# Patient Record
Sex: FEMALE | Race: BLACK OR AFRICAN AMERICAN | Employment: FULL TIME | URBAN - METROPOLITAN AREA
[De-identification: names, ages, dates, MRNs, and addresses within clinical notes are randomized per-mention and may not be internally consistent; named-entity substitution may affect disease eponyms.]

---

## 2022-07-01 ENCOUNTER — OFFICE VISIT (OUTPATIENT)
Dept: FAMILY MEDICINE CLINIC | Age: 47
End: 2022-07-01
Payer: COMMERCIAL

## 2022-07-01 VITALS
RESPIRATION RATE: 16 BRPM | HEIGHT: 65 IN | OXYGEN SATURATION: 100 % | DIASTOLIC BLOOD PRESSURE: 72 MMHG | SYSTOLIC BLOOD PRESSURE: 118 MMHG | WEIGHT: 212 LBS | BODY MASS INDEX: 35.32 KG/M2 | HEART RATE: 81 BPM

## 2022-07-01 DIAGNOSIS — Z12.11 COLON CANCER SCREENING: ICD-10-CM

## 2022-07-01 DIAGNOSIS — Z76.89 ENCOUNTER TO ESTABLISH CARE: Primary | ICD-10-CM

## 2022-07-01 DIAGNOSIS — K21.9 GASTROESOPHAGEAL REFLUX DISEASE WITHOUT ESOPHAGITIS: ICD-10-CM

## 2022-07-01 DIAGNOSIS — Z01.89 ENCOUNTER FOR BLOOD TEST: ICD-10-CM

## 2022-07-01 PROCEDURE — 99203 OFFICE O/P NEW LOW 30 MIN: CPT | Performed by: STUDENT IN AN ORGANIZED HEALTH CARE EDUCATION/TRAINING PROGRAM

## 2022-07-01 NOTE — PROGRESS NOTES
Gilda Ross is an 49 y.o. female who presents to [facility] care   Patient was previously receiving care at: PCP: Dr. Ezekiel Johnston. Last time she was seen ~ 2 years ago. Medical history significant for: GERD. GERD: Omeprazole 40 mg PRN. Currently not having any complaints. Gyn Care: Does not have OB/GYN MD. PAP Smear 2 year ago. [id number]. Regular menses. LMP: 6/26. Duration: 5 days, moderate bleeding. Have cramps and pain. Social: . Currently living with  and one child. The other 3 children are in [place]. Alcohol: occasionally: Never smoke. No illicit drugs. Exercise: Not at all   Diet: Mostly homemade food. Occupation: CNA. Covid vaccine x3 1/4/21--1/25/21. Booster 10/21  Reports getting Tdap in 2020. Review of Systems   General/Constitutional:   No headache, fever, fatigue, weight loss or weight gain       Eyes:   No redness, pruritis, pain, visual changes, swelling, or discharge      Ears:    No pain, loss or changes in hearing     Neck:   No swelling, masses, stiffness, pain, or limited movement     Cardiac:    No chest pain      Respiratory:   No cough or shortness of breath     GI:   No nausea/vomiting, diarrhea, abdominal pain, bloody or dark stools       :   No dysuria or  hematuria    Neurological:   No loss of consciousness, dizziness, seizures, dysarthria, cognitive changes, memory changes,  problems with balance, or unilateral weakness     Skin: No rash     Current Medications  Current medications include:   No current outpatient medications on file. No current facility-administered medications for this visit. Allergies  Allergies   Allergen Reactions    Aspirin Other (comments)     Reaction Type: Allergy, Upset Stomach       Past Medical History  No past medical history on file. Past Surgical History   No past surgical history on file.     Family History  Family History   Problem Relation Age of Onset    Diabetes Mother     Hypertension Mother        No FH of breast cancer: NO   No FH of colon cancer: NO    Social History  Social History     Socioeconomic History    Marital status:      Spouse name: Not on file    Number of children: Not on file    Years of education: Not on file    Highest education level: Not on file   Occupational History    Not on file   Tobacco Use    Smoking status: Never Smoker    Smokeless tobacco: Never Used   Substance and Sexual Activity    Alcohol use: Yes    Drug use: Not on file    Sexual activity: Not on file   Other Topics Concern    Not on file   Social History Narrative    Not on file     Social Determinants of Health     Financial Resource Strain:     Difficulty of Paying Living Expenses: Not on file   Food Insecurity:     Worried About Running Out of Food in the Last Year: Not on file    [place] of Food in the Last Year: Not on file   Transportation Needs:     Lack of Transportation (Medical): Not on file    Lack of Transportation (Non-Medical):  Not on file   Physical Activity:     Days of Exercise per Week: Not on file    Minutes of Exercise per Session: Not on file   Stress:     Feeling of Stress : Not on file   Social Connections:     Frequency of Communication with Friends and Family: Not on file    Frequency of Social Gatherings with Friends and Family: Not on file    Attends [employer] Services: Not on file    Active Member of [address] [place] [address] Anna Lozabai or Organizations: Not on file    Attends Club or Organization Meetings: Not on file    Marital Status: Not on file   Intimate Partner Violence:     Fear of Current or Ex-Partner: Not on file    Emotionally Abused: Not on file    Physically Abused: Not on file    Sexually Abused: Not on file   Housing Stability:     Unable to Pay for Housing in the Last Year: Not on file    Number of [place] in the Last Year: Not on file    Unstable Housing in the Last Year: Not on file       Immunizations  Immunization History   Administered Date(s) Administered   [employer] COVID-19, PFIZER PURPLE top, DILUTE for use, (age 15 y+), IM, 30mcg/0.3mL 01/04/2021, 01/25/2021, 10/20/2021    DTaP 10/14/2020       Health Maintenance  Colonoscopy: Will order it. DEXA scan: N/A  HIV testing: Prior done - Neg  Hepatitis C testing: Will order. Lung cancer screening: N/A    Objective   Vital Signs  Visit Vitals  /72 (BP 1 Location: Left upper arm, BP Patient Position: Sitting, BP Cuff Size: Adult)   Pulse 81   Resp 16   Ht 5' 4.96\" (1.65 m)   Wt 212 lb (96.2 kg)   SpO2 100%   BMI 35.32 kg/m²       Physical Examination  GEN: No apparent distress. Alert and oriented and responds to all questions appropriately. EYES:  Conjunctiva clear; pupils round and reactive to light; extraocular movements areintact. EAR: External ears are normal.  Tympanic membranes are clear and without effusion. NOSE: Turbinates are within normal limits. No drainage  OROPHYARYNX: No oral lesions or exudates. NECK:  Supple; no masses; thyroid normal           LUNGS: Respirations unlabored; clear to auscultation bilaterally  CARDIOVASCULAR: Regular, rate, and rhythm without murmurs, gallops or rubs   ABDOMEN: Soft; nontender; nondistended; normoactive bowel sounds; no masses or organomegaly  NEUROLOGIC:  No focal neurologic deficits. Strength and sensation grossly intact. Coordination and gait grossly intact. EXT: Well perfused. No edema. SKIN: No obvious rashes. Assessment:   Jeana  is a 49 y.o. here to establish to care     Plan:   Establish Care:  · Counseled re: diet, exercise, healthy lifestyle  · Appropriate labs and referrals ordered as listed above  - Exercise advised  - Will obtain medical records. GERD: Stable  - Continue Omeprazole 40 mg PO as needed    Encounter for blood work:  - CBC, BMP, A1c, TSH, Vit D level , Lipid panel, Hep C Ab. Colon Cancer Screening:   - GI referral and contact info given.      I discussed the aforementioned diagnoses with the patient as well as the plan of care. All questions were answered and an AVS was provided.      I discussed this patient with Dr. Savana Gottlieb (Attending Physician)      Signed By:  Ivana Mccabe MD

## 2022-07-05 ENCOUNTER — HOSPITAL ENCOUNTER (EMERGENCY)
Age: 47
Discharge: HOME | End: 2022-07-06
Attending: EMERGENCY MEDICINE
Payer: COMMERCIAL

## 2022-07-05 ENCOUNTER — DOCUMENTATION ONLY (OUTPATIENT)
Dept: FAMILY MEDICINE CLINIC | Age: 47
End: 2022-07-05

## 2022-07-05 VITALS
OXYGEN SATURATION: 100 % | SYSTOLIC BLOOD PRESSURE: 128 MMHG | DIASTOLIC BLOOD PRESSURE: 81 MMHG | HEIGHT: 65 IN | TEMPERATURE: 98.4 F | HEART RATE: 69 BPM | WEIGHT: 212 LBS | BODY MASS INDEX: 35.32 KG/M2 | RESPIRATION RATE: 19 BRPM

## 2022-07-05 DIAGNOSIS — D64.9 ANEMIA, UNSPECIFIED TYPE: Primary | ICD-10-CM

## 2022-07-05 LAB
25(OH)D3+25(OH)D2 SERPL-MCNC: 30.9 NG/ML (ref 30–100)
ALBUMIN SERPL-MCNC: 3.5 G/DL (ref 3.5–5)
ALBUMIN/GLOB SERPL: 0.8 {RATIO} (ref 1.1–2.2)
ALP SERPL-CCNC: 58 U/L (ref 45–117)
ALT SERPL-CCNC: 14 U/L (ref 12–78)
ANION GAP SERPL CALC-SCNC: 8 MMOL/L (ref 5–15)
AST SERPL-CCNC: 10 U/L (ref 15–37)
BASOPHILS # BLD: 0 K/UL (ref 0–0.1)
BASOPHILS NFR BLD: 0 % (ref 0–1)
BILIRUB SERPL-MCNC: 0.8 MG/DL (ref 0.2–1)
BUN SERPL-MCNC: 10 MG/DL (ref 6–24)
BUN SERPL-MCNC: 11 MG/DL (ref 6–20)
BUN/CREAT SERPL: 17 (ref 12–20)
BUN/CREAT SERPL: 18 (ref 9–23)
CALCIUM SERPL-MCNC: 8.9 MG/DL (ref 8.5–10.1)
CALCIUM SERPL-MCNC: 9 MG/DL (ref 8.7–10.2)
CHLORIDE SERPL-SCNC: 104 MMOL/L (ref 96–106)
CHLORIDE SERPL-SCNC: 109 MMOL/L (ref 97–108)
CHOLEST SERPL-MCNC: 147 MG/DL (ref 100–199)
CO2 SERPL-SCNC: 26 MMOL/L (ref 20–29)
CO2 SERPL-SCNC: 26 MMOL/L (ref 21–32)
COMMENT, HOLDF: NORMAL
CREAT SERPL-MCNC: 0.57 MG/DL (ref 0.57–1)
CREAT SERPL-MCNC: 0.66 MG/DL (ref 0.55–1.02)
DIFFERENTIAL METHOD BLD: ABNORMAL
EGFR: 112 ML/MIN/1.73
EOSINOPHIL # BLD: 0.1 K/UL (ref 0–0.4)
EOSINOPHIL NFR BLD: 2 % (ref 0–7)
ERYTHROCYTE [DISTWIDTH] IN BLOOD BY AUTOMATED COUNT: 19.4 % (ref 11.7–15.4)
ERYTHROCYTE [DISTWIDTH] IN BLOOD BY AUTOMATED COUNT: 22.6 % (ref 11.5–14.5)
EST. AVERAGE GLUCOSE BLD GHB EST-MCNC: 105 MG/DL
GLOBULIN SER CALC-MCNC: 4.2 G/DL (ref 2–4)
GLUCOSE SERPL-MCNC: 81 MG/DL (ref 65–99)
GLUCOSE SERPL-MCNC: 94 MG/DL (ref 65–100)
HBA1C MFR BLD: 5.3 % (ref 4.8–5.6)
HCT VFR BLD AUTO: 22.3 % (ref 35–47)
HCT VFR BLD AUTO: 22.5 % (ref 34–46.6)
HCV AB S/CO SERPL IA: <0.1 S/CO RATIO (ref 0–0.9)
HDLC SERPL-MCNC: 68 MG/DL
HGB BLD-MCNC: 5.5 G/DL (ref 11.1–15.9)
HGB BLD-MCNC: 6 G/DL (ref 11.5–16)
HISTORY CHECKED?,CKHIST: NORMAL
IMM GRANULOCYTES # BLD AUTO: 0 K/UL
IMM GRANULOCYTES NFR BLD AUTO: 0 %
IMP & REVIEW OF LAB RESULTS: NORMAL
LDLC SERPL CALC-MCNC: 71 MG/DL (ref 0–99)
LYMPHOCYTES # BLD: 1.1 K/UL (ref 0.8–3.5)
LYMPHOCYTES NFR BLD: 25 % (ref 12–49)
MCH RBC QN AUTO: 16.2 PG (ref 26.6–33)
MCH RBC QN AUTO: 16.8 PG (ref 26–34)
MCHC RBC AUTO-ENTMCNC: 24.4 G/DL (ref 31.5–35.7)
MCHC RBC AUTO-ENTMCNC: 26.9 G/DL (ref 30–36.5)
MCV RBC AUTO: 62.5 FL (ref 80–99)
MCV RBC AUTO: 66 FL (ref 79–97)
MONOCYTES # BLD: 0.5 K/UL (ref 0–1)
MONOCYTES NFR BLD: 11 % (ref 5–13)
NEUTS SEG # BLD: 2.7 K/UL (ref 1.8–8)
NEUTS SEG NFR BLD: 62 % (ref 32–75)
NRBC # BLD: 0 K/UL (ref 0–0.01)
NRBC BLD-RTO: 0 PER 100 WBC
PLATELET # BLD AUTO: 236 X10E3/UL (ref 150–450)
PLATELET # BLD AUTO: 394 K/UL (ref 150–400)
PMV BLD AUTO: ABNORMAL FL (ref 8.9–12.9)
POTASSIUM SERPL-SCNC: 3.4 MMOL/L (ref 3.5–5.1)
POTASSIUM SERPL-SCNC: 4.1 MMOL/L (ref 3.5–5.2)
PROT SERPL-MCNC: 7.7 G/DL (ref 6.4–8.2)
RBC # BLD AUTO: 3.39 X10E6/UL (ref 3.77–5.28)
RBC # BLD AUTO: 3.57 M/UL (ref 3.8–5.2)
RBC MORPH BLD: ABNORMAL
SAMPLES BEING HELD,HOLD: NORMAL
SODIUM SERPL-SCNC: 138 MMOL/L (ref 134–144)
SODIUM SERPL-SCNC: 143 MMOL/L (ref 136–145)
TRIGL SERPL-MCNC: 32 MG/DL (ref 0–149)
TSH SERPL DL<=0.005 MIU/L-ACNC: 0.57 UIU/ML (ref 0.45–4.5)
VLDLC SERPL CALC-MCNC: 8 MG/DL (ref 5–40)
WBC # BLD AUTO: 3.3 X10E3/UL (ref 3.4–10.8)
WBC # BLD AUTO: 4.4 K/UL (ref 3.6–11)

## 2022-07-05 PROCEDURE — 85025 COMPLETE CBC W/AUTO DIFF WBC: CPT

## 2022-07-05 PROCEDURE — 36415 COLL VENOUS BLD VENIPUNCTURE: CPT

## 2022-07-05 PROCEDURE — 2709999900 HC NON-CHARGEABLE SUPPLY

## 2022-07-05 PROCEDURE — 86923 COMPATIBILITY TEST ELECTRIC: CPT

## 2022-07-05 PROCEDURE — P9016 RBC LEUKOCYTES REDUCED: HCPCS

## 2022-07-05 PROCEDURE — 99285 EMERGENCY DEPT VISIT HI MDM: CPT

## 2022-07-05 PROCEDURE — 36430 TRANSFUSION BLD/BLD COMPNT: CPT

## 2022-07-05 PROCEDURE — 86900 BLOOD TYPING SEROLOGIC ABO: CPT

## 2022-07-05 PROCEDURE — 80053 COMPREHEN METABOLIC PANEL: CPT

## 2022-07-05 RX ORDER — SODIUM CHLORIDE 9 MG/ML
250 INJECTION, SOLUTION INTRAVENOUS AS NEEDED
Status: DISCONTINUED | OUTPATIENT
Start: 2022-07-05 | End: 2022-07-06 | Stop reason: HOSPADM

## 2022-07-05 RX ORDER — LANOLIN ALCOHOL/MO/W.PET/CERES
325 CREAM (GRAM) TOPICAL
Qty: 30 TABLET | Refills: 0 | Status: SHIPPED | OUTPATIENT
Start: 2022-07-05 | End: 2022-08-04

## 2022-07-05 NOTE — ED TRIAGE NOTES
Patient reports fatigue, was seen at pcp and had blood work, was called today and told her hgb is 5.5. denies any obvious bleeding

## 2022-07-05 NOTE — ED PROVIDER NOTES
HPI   50-year-old female with a past medical history of hypertension and diabetes who presents to the emergency department per the instruction of her PCP due to a hemoglobin of 5.5. Patient had not been seen by her PCP in about 2 years. She says for months she has been very fatigued and overly tired. She followed up with her doctor and she was noted to have a hemoglobin of 5.5. She says she has had a history of anemia in the past but this improved with iron supplementation and she has since discontinued iron supplementation. She does state that her periods are normally quite heavy, but the last.  She has was not as heavy. She says her menstrual cycles are regular. She denies any blood in her stool or dark/tarry stools. She is not on blood thinners. She denies fevers or chills. No cough. No chest pain or shortness of breath. No past medical history on file. No past surgical history on file. Family History:   Problem Relation Age of Onset    Diabetes Mother     Hypertension Mother        Social History     Socioeconomic History    Marital status:      Spouse name: Not on file    Number of children: Not on file    Years of education: Not on file    Highest education level: Not on file   Occupational History    Not on file   Tobacco Use    Smoking status: Never Smoker    Smokeless tobacco: Never Used   Substance and Sexual Activity    Alcohol use: Yes    Drug use: Not on file    Sexual activity: Not on file   Other Topics Concern    Not on file   Social History Narrative    Not on file     Social Determinants of Health     Financial Resource Strain:     Difficulty of Paying Living Expenses: Not on file   Food Insecurity:     Worried About Running Out of Food in the Last Year: Not on file    [place] of Food in the Last Year: Not on file   Transportation Needs:     Lack of Transportation (Medical): Not on file    Lack of Transportation (Non-Medical):  Not on file   Physical Activity:     Days of Exercise per Week: Not on file    Minutes of Exercise per Session: Not on file   Stress:     Feeling of Stress : Not on file   Social Connections:     Frequency of Communication with Friends and Family: Not on file    Frequency of Social Gatherings with Friends and Family: Not on file    Attends Jewish Services: Not on file    Active Member of Clubs or Organizations: Not on file    Attends Club or Organization Meetings: Not on file    Marital Status: Not on file   Intimate Partner Violence:     Fear of Current or Ex-Partner: Not on file    Emotionally Abused: Not on file    Physically Abused: Not on file    Sexually Abused: Not on file   Housing Stability:     Unable to Pay for Housing in the Last Year: Not on file    Number of [place] in the Last Year: Not on file    Unstable Housing in the Last Year: Not on file         ALLERGIES: Aspirin    Review of Systems   A complete review of systems was performed and all systems reviewed are negative unless otherwise documented in the HPI    Vitals:    07/05/22 1725   BP: (!) 145/71   Pulse: 100   Resp: 18   Temp: 98.6 °F (37 °C)   SpO2: 100%   Weight: 96.2 kg (212 lb)   Height: 5' 5\" (1.651 m)            Physical Exam  Constitutional:       Comments: Resting comfortably in no acute distress   HENT:      Mouth/Throat:      Comments: Moist mucous membranes  Eyes:      Extraocular Movements: Extraocular movements intact. Comments: Pale conjunctiva. No scleral icterus   Neck:      Comments: Trachea midline. No JVD  Cardiovascular:      Comments: Regular rate and rhythm. Delayed capillary refill. No murmurs  Pulmonary:      Effort: Pulmonary effort is normal. No respiratory distress. Breath sounds: Normal breath sounds. No wheezing or rales. Abdominal:      General: There is no distension. Palpations: Abdomen is soft. Tenderness: There is no abdominal tenderness.    Musculoskeletal:         General: Normal range of motion. Cervical back: Normal range of motion. Right lower leg: No edema. Left lower leg: No edema. Skin:     General: Skin is warm and dry. Coloration: Skin is not pale. Neurological:      Comments: Awake and alert. Speech is normal.  GCS 15          MDM   41-year-old female presents with the above chief complaint. Vital signs stable. She has pale conjunctiva but otherwise her exam is unremarkable. Hemoglobin here today is 6. Patient is agreeable to blood and will be transfused 1 unit. Her MCV is quite low at 62.5. She tells me she eats a lot of ice. I suspect she has iron deficiency anemia and will need to be on iron supplementation going forward. Metabolic panel reassuring. Patient will be signed out to Dr. Agapito Harmon pending reassessment after she receives her blood products. I will prescribe her some iron supplementation as well as refer her to her PCP and OB/GYN. Procedures      I have discussed with the patient the rationale for blood component transfusion; its benefits in treating or preventing fatigue, organ damage, or death; and its risk which includes mild transfusion reactions, rare risk of blood borne infection, or more serious but rare reactions. I have discussed the alternatives to transfusion, including the risk and consequences of not receiving transfusion. The patient had an opportunity to ask questions and had agreed to proceed with transfusion of blood components. This addendum was written to reflect consent for blood product transfusion occurred on the date of the patient encounter.

## 2022-07-05 NOTE — PROGRESS NOTES
CBC with H.5. Low red blood cells. Denies any bleeding. Mentioned has been low in the past but not this low. I have called patient and advised to go to ED for further evaluation and treatment. Verbalized understanding. Mentioned will go ASAP. CBC, BMP, A1c, Lipid panel, TSH, Vitamin D level wnl. Hep C Ab neg.

## 2022-07-06 LAB
ABO + RH BLD: NORMAL
BLD PROD TYP BPU: NORMAL
BLOOD GROUP ANTIBODIES SERPL: NORMAL
BPU ID: NORMAL
CROSSMATCH RESULT,%XM: NORMAL
SPECIMEN EXP DATE BLD: NORMAL
STATUS OF UNIT,%ST: NORMAL
UNIT DIVISION, %UDIV: 0

## 2022-07-06 NOTE — ED NOTES
Transfusion has ended and pt symptoms have resolved. Pt d/c from ed. Opportunity for questions given. Pt ambulatory and left ED w/ family.

## 2022-07-06 NOTE — DISCHARGE INSTRUCTIONS
Return to the emergency department with any new or worsening symptoms, lightheadedness, passing out, or anything else you find concerning such as blood in your stool or dark tarry stools.   Please follow-up with your primary care doctor and you should also establish care with OB/GYN to discuss your heavy periods and whether this is contributing to your anemia

## 2022-07-08 ENCOUNTER — DOCUMENTATION ONLY (OUTPATIENT)
Dept: FAMILY MEDICINE CLINIC | Age: 47
End: 2022-07-08

## 2022-07-08 NOTE — PROGRESS NOTES
Pt went to ED on 7/5, received blood transfusion. Has an upcoming appt with OB/GYN on 7/18 for anemia work up as she was mentioning heavy menses.

## 2022-07-13 NOTE — PROGRESS NOTES
[address]  [address] [facility] [place], [facility] [place]   [phone]             Date of visit: 7/14/2022   Subjective:      History obtained from:  The patient. Eugenio Christensen is a 49 y.o. female  with PMH of GERD who presented for follow up after a ED visit on 7/5/22. She had initially come to the clinic for chronic fatigue. She was contacted and told to visit the ED for low hemoglobin levels of 5. 5. where she recieived a blood transfusion. She reports having regular heavy menses all of her adult life- 5 pads/day with clots 6 days every cycle. Her last pregnancy was 13 years ago. She has a mixed diet with meat. She takes iron supplements once daily. and omeprazole for GERD. Denies use of blood thinners . She has a scheduled Ob visit on 7/18 and colonoscopy. Denies blood in stool/urine. Review of Systems  Review of Systems   Constitutional: Positive for malaise/fatigue. Negative for chills, fever and weight loss. HENT: Negative for congestion, nosebleeds and sore throat. Eyes: Negative for discharge and redness. Respiratory: Negative for cough, hemoptysis and sputum production. Cardiovascular: Negative for chest pain, palpitations and leg swelling. Gastrointestinal: Negative for abdominal pain, nausea and vomiting. Genitourinary: Negative for dysuria. Skin: Negative for rash. Patient Active Problem List    Diagnosis Date Noted    Gastroesophageal reflux disease without esophagitis 07/01/2022     Current Outpatient Medications   Medication Sig Dispense Refill    omeprazole (PRILOSEC) 40 mg capsule       ferrous sulfate (Iron) 325 mg (65 mg iron) tablet Take 1 Tablet by mouth Daily (before breakfast) for 30 days. 30 Tablet 0     Allergies   Allergen Reactions    Aspirin Other (comments)     Reaction Type: Allergy, Upset Stomach     History reviewed. No pertinent past medical history. History reviewed. No pertinent surgical history.   Family History   Problem Relation Age of Onset    Diabetes Mother     Hypertension Mother      Social History     Tobacco Use    Smoking status: Never Smoker    Smokeless tobacco: Never Used   Substance Use Topics    Alcohol use: Yes      Social History     Social History Narrative    Not on file          Objective:     Vitals:    07/14/22 1410 07/14/22 1418   BP: (!) 98/59 105/62   Pulse: 69    Resp: 16    Temp: 99.3 °F (37.4 °C)    TempSrc: Oral    SpO2: 98%    Weight: 211 lb (95.7 kg)    Height: 5' 5\" (1.651 m)      Body mass index is 35.11 kg/m². Physical Exam  HENT:      Head: Normocephalic. Nose: Nose normal.      Mouth/Throat:      Mouth: Mucous membranes are moist.   Eyes:      General: No scleral icterus. Comments: pallor   Cardiovascular:      Rate and Rhythm: Normal rate and regular rhythm. Pulses: Normal pulses. Pulmonary:      Effort: Pulmonary effort is normal.   Abdominal:      General: Abdomen is flat. There is no distension. Palpations: There is no mass. Tenderness: There is no abdominal tenderness. Musculoskeletal:      Right lower leg: No edema. Left lower leg: No edema. Skin:     General: Skin is warm and dry. Capillary Refill: Capillary refill takes less than 2 seconds. Comments: Pale nail bed   Neurological:      Mental Status: She is alert and oriented to person, place, and time.    Psychiatric:         Mood and Affect: Mood normal.         Behavior: Behavior normal.           Lab Results   Component Value Date/Time    Hemoglobin A1c 5.3 07/01/2022 09:50 AM     Lab Results   Component Value Date/Time    Cholesterol, total 147 07/01/2022 09:50 AM    HDL Cholesterol 68 07/01/2022 09:50 AM    LDL, calculated 71 07/01/2022 09:50 AM    VLDL, calculated 8 07/01/2022 09:50 AM    Triglyceride 32 07/01/2022 09:50 AM     Lab Results   Component Value Date/Time    Sodium 143 07/05/2022 06:59 PM    Potassium 3.4 (L) 07/05/2022 06:59 PM    Chloride 109 (H) 07/05/2022 06:59 PM    CO2 26 07/05/2022 06:59 PM    Anion gap 8 07/05/2022 06:59 PM    Glucose 94 07/05/2022 06:59 PM    BUN 11 07/05/2022 06:59 PM    Creatinine 0.66 07/05/2022 06:59 PM    BUN/Creatinine ratio 17 07/05/2022 06:59 PM    GFR est AA >60 07/05/2022 06:59 PM    GFR est non-AA >60 07/05/2022 06:59 PM    Calcium 8.9 07/05/2022 06:59 PM    Bilirubin, total 0.8 07/05/2022 06:59 PM    Alk. phosphatase 58 07/05/2022 06:59 PM    Protein, total 7.7 07/05/2022 06:59 PM    Albumin 3.5 07/05/2022 06:59 PM    Globulin 4.2 (H) 07/05/2022 06:59 PM    A-G Ratio 0.8 (L) 07/05/2022 06:59 PM    ALT (SGPT) 14 07/05/2022 06:59 PM     Lab Results   Component Value Date/Time    WBC 4.4 07/05/2022 06:59 PM    HGB 6.0 (L) 07/05/2022 06:59 PM    HCT 22.3 (L) 07/05/2022 06:59 PM    PLATELET 191 85/88/5820 06:59 PM    MCV 62.5 (L) 07/05/2022 06:59 PM     Lab Results   Component Value Date/Time    TSH 0.571 07/01/2022 09:50 AM     Lab Results   Component Value Date/Time    VITAMIN D, 25-HYDROXY 30.9 07/01/2022 09:50 AM              Assessment/Plan:       ICD-10-CM ICD-9-CM    1. Anemia, unspecified type  D64.9 285.9 VITAMIN B12 & FOLATE      RETICULOCYTE COUNT      PERIPHERAL SMEAR      IRON PROFILE      FERRITIN      LD      HAPTOGLOBIN      HAPTOGLOBIN      LD      FERRITIN      IRON PROFILE      PERIPHERAL SMEAR      RETICULOCYTE COUNT      VITAMIN B12 & FOLATE   2. Menorrhagia with regular cycle  N92.0 626.2 VITAMIN B12 & FOLATE      RETICULOCYTE COUNT      PERIPHERAL SMEAR      IRON PROFILE      FERRITIN      LD      HAPTOGLOBIN      HAPTOGLOBIN      LD      FERRITIN      IRON PROFILE      PERIPHERAL SMEAR      RETICULOCYTE COUNT      VITAMIN B12 & FOLATE       Orders Placed This Encounter    VITAMIN B12 & FOLATE    RETICULOCYTE COUNT    PERIPHERAL SMEAR    IRON PROFILE    FERRITIN    LD    HAPTOGLOBIN    omeprazole (PRILOSEC) 40 mg capsule     Anemia: Likely secondary to menorrhagia.  Lab with low MCV levels, likely iron deficiency anemia from chronic blood loss. - Advised switching Ferrous sulphate supplements from once to twice daily  - Explained constipation as likely side effect of iron supplement. Informed about OTC Colace for relief. - Follow up anemia workup  - IF Hgb < 7, need to visit ER for transfusion. May require long term follow up with Hematology OP for iron transfusions  - Patient verbalized understanding and all questions and concerns were addressed    Menorrhagia: Chronic  - Advised follow up with ObGyn, explained need for uterine scan, possibilities of IUD/Progesterone for symptom reduction    Discussed the diagnosis and plan and she expressed understanding. Follow-up and Dispositions    · Return in about 4 weeks (around 8/11/2022) for follow up.        Signed By: Sharia Bloch, MD     July 14, 2022       Sharia Bloch, MD     I discussed the case with Dr. Linda Craft MD

## 2022-07-14 ENCOUNTER — OFFICE VISIT (OUTPATIENT)
Dept: FAMILY MEDICINE CLINIC | Age: 47
End: 2022-07-14
Payer: COMMERCIAL

## 2022-07-14 VITALS
SYSTOLIC BLOOD PRESSURE: 105 MMHG | DIASTOLIC BLOOD PRESSURE: 62 MMHG | TEMPERATURE: 99.3 F | HEIGHT: 65 IN | BODY MASS INDEX: 35.16 KG/M2 | WEIGHT: 211 LBS | RESPIRATION RATE: 16 BRPM | HEART RATE: 69 BPM | OXYGEN SATURATION: 98 %

## 2022-07-14 DIAGNOSIS — N92.0 MENORRHAGIA WITH REGULAR CYCLE: ICD-10-CM

## 2022-07-14 DIAGNOSIS — D64.9 ANEMIA, UNSPECIFIED TYPE: Primary | ICD-10-CM

## 2022-07-14 PROCEDURE — 99214 OFFICE O/P EST MOD 30 MIN: CPT

## 2022-07-14 RX ORDER — OMEPRAZOLE 40 MG/1
CAPSULE, DELAYED RELEASE ORAL
COMMUNITY
Start: 2021-12-15

## 2022-07-14 NOTE — PATIENT INSTRUCTIONS
Anemia From Heavy Bleeding: Care Instructions  Your Care Instructions     Anemia means that your body does not have enough red blood cells. Red blood cells carry oxygen around the body. When you have anemia, you may feel dizzy, tired, and weak. You may also feel your heart pounding. For some people, it's hard to focus and think clearly. One common cause of anemia is bleeding. Bleeding from ulcers, hemorrhoids, cancer, or other problems can cause anemia. It may also be caused by heavy menstrual periods. Your treatment may include iron pills. Iron helps your body make hemoglobin. Hemoglobin is the part of the red blood cell that carries oxygen. If you have severe anemia, you may need a blood transfusion to give you red blood cells as quickly as possible. Sometimes it takes several months to get iron levels back to normal.  Follow-up care is a key part of your treatment and safety. Be sure to make and go to all appointments, and call your doctor if you are having problems. It's also a good idea to know your test results and keep a list of the medicines you take. How can you care for yourself at home? · Be safe with medicines. Take your medicines exactly as prescribed. Call your doctor if you think you are having a problem with your medicine. · Follow your doctor's advice about eating foods that have a lot of iron in them. These include red meat, shellfish, poultry, and eggs. They also include beans, raisins, whole-grain bread, and leafy green vegetables. · Steam your vegetables. This is the best way to prepare them if you want to get as much iron as possible. · Iron pills can cause constipation. If you take them, there are things you can do to avoid constipation. Drink plenty of fluids, eat foods with a lot of fiber, and exercise every day. When should you call for help? Call 911 anytime you think you may need emergency care.  For example, call if:    · You passed out (lost consciousness).     · Your stools are maroon or very bloody. Call your doctor now or seek immediate medical care if:    · You are short of breath.     · You have new or worse bleeding.     · You are dizzy or light-headed, or you feel like you may faint. Watch closely for changes in your health, and be sure to contact your doctor if:    · You feel weaker or more tired than usual.     · You do not get better as expected. Where can you learn more? Go to http://www.gray.com/  Enter I435 in the search box to learn more about \"Anemia From Heavy Bleeding: Care Instructions. \"  Current as of: November 29, 2021               Content Version: 13.2  © 2006-2022 [employer]. Care instructions adapted under license by [employer] (which disclaims liability or warranty for this information). If you have questions about a medical condition or this instruction, always ask your healthcare professional. [address] any warranty or liability for your use of this information.

## 2022-07-14 NOTE — PROGRESS NOTES
Jayce Dominguez is a 49 y.o. female    Chief Complaint   Patient presents with   [facility] Follow Up     Patient is coming in for a hospital follow up. patient came 2 weeks ago and had abnormal lab results. She had blood transfusion on tuesday of last week. She states taht feels tired today. No other concerns. 1. Have you been to the ER, urgent care clinic since your last visit? Hospitalized since your last visit? No  2. Have you seen or consulted any other health care providers outside of the [address] [place] [address] since your last visit? Include any pap smears or colon screening. No    Vitals:    07/14/22 1410 07/14/22 1418   BP: (!) 98/59 105/62   BP 1 Location: Right upper arm Left upper arm   BP Patient Position: Sitting Sitting   Pulse: 69    Temp: 99.3 °F (37.4 °C)    TempSrc: Oral    Resp: 16    Height: 5' 5\" (1.651 m)    Weight: 211 lb (95.7 kg)    SpO2: 98%          Health Maintenance Due   Topic Date Due    Cervical cancer screen  Never done    Colorectal Cancer Screening Combo  Never done         Medication Reconciliation completed, changes noted.   Please  Update medication list.

## 2022-07-17 LAB
BASOPHILS # BLD AUTO: 0 X10E3/UL (ref 0–0.2)
BASOPHILS NFR BLD AUTO: 1 %
EOSINOPHIL # BLD AUTO: 0.1 X10E3/UL (ref 0–0.4)
EOSINOPHIL NFR BLD AUTO: 1 %
ERYTHROCYTE [DISTWIDTH] IN BLOOD BY AUTOMATED COUNT: 24.3 % (ref 11.7–15.4)
FERRITIN SERPL-MCNC: 11 NG/ML (ref 15–150)
FOLATE SERPL-MCNC: 14.9 NG/ML
HAPTOGLOB SERPL-MCNC: 49 MG/DL (ref 42–296)
HCT VFR BLD AUTO: 29.5 % (ref 34–46.6)
HGB BLD-MCNC: 7.7 G/DL (ref 11.1–15.9)
IMM GRANULOCYTES # BLD AUTO: 0 X10E3/UL (ref 0–0.1)
IMM GRANULOCYTES NFR BLD AUTO: 0 %
IRON SATN MFR SERPL: 29 % (ref 15–55)
IRON SERPL-MCNC: 114 UG/DL (ref 27–159)
LDH SERPL-CCNC: 144 IU/L (ref 119–226)
LYMPHOCYTES # BLD AUTO: 1.4 X10E3/UL (ref 0.7–3.1)
LYMPHOCYTES NFR BLD AUTO: 32 %
MCH RBC QN AUTO: 18.4 PG (ref 26.6–33)
MCHC RBC AUTO-ENTMCNC: 26.1 G/DL (ref 31.5–35.7)
MCV RBC AUTO: 71 FL (ref 79–97)
MONOCYTES # BLD AUTO: 0.5 X10E3/UL (ref 0.1–0.9)
MONOCYTES NFR BLD AUTO: 12 %
MORPHOLOGY BLD-IMP: ABNORMAL
NEUTROPHILS # BLD AUTO: 2.3 X10E3/UL (ref 1.4–7)
NEUTROPHILS NFR BLD AUTO: 54 %
PATH INTERP BLD-IMP: ABNORMAL
PATH REV BLD -IMP: ABNORMAL
PATHOLOGIST NAME: ABNORMAL
PLATELET # BLD AUTO: 182 X10E3/UL (ref 150–450)
RBC # BLD AUTO: 4.18 X10E6/UL (ref 3.77–5.28)
RETICS/RBC NFR AUTO: 1.5 % (ref 0.6–2.6)
TIBC SERPL-MCNC: 396 UG/DL (ref 250–450)
UIBC SERPL-MCNC: 282 UG/DL (ref 131–425)
VIT B12 SERPL-MCNC: 804 PG/ML (ref 232–1245)
WBC # BLD AUTO: 4.3 X10E3/UL (ref 3.4–10.8)

## 2022-07-17 NOTE — PROGRESS NOTES
The blood tests show a picture of iron deficiency anemia (low iron levels which help in producing blood) probably secondary to blood loss during periods. There is no need for a blood transfusion now.  Continue your iron supplements twice daily and follow up with your ObGyn

## 2022-07-18 ENCOUNTER — OFFICE VISIT (OUTPATIENT)
Dept: OBGYN CLINIC | Age: 47
End: 2022-07-18
Payer: COMMERCIAL

## 2022-07-18 VITALS
HEART RATE: 96 BPM | WEIGHT: 211.2 LBS | SYSTOLIC BLOOD PRESSURE: 127 MMHG | BODY MASS INDEX: 35.15 KG/M2 | DIASTOLIC BLOOD PRESSURE: 77 MMHG

## 2022-07-18 DIAGNOSIS — N92.0 MENORRHAGIA WITH REGULAR CYCLE: Primary | ICD-10-CM

## 2022-07-18 PROCEDURE — 99202 OFFICE O/P NEW SF 15 MIN: CPT | Performed by: OBSTETRICS & GYNECOLOGY

## 2022-07-18 NOTE — PROGRESS NOTES
Abnormal bleeding note      Sangeeta Shaikh is a 49 y.o. female who complains of vaginal bleeding problems. Her current method of family planning is none. She developed this problem approximately several years ago. She has had vaginal bleeding which she describes as regular cycles lasting up to 6 days. No IMB. Pad or tampon count: changes every 2 hours. Associated symptoms include heavy bleeding. Recent HGB was 5-6 and she has had a transfusion    Alleviating factors: none    Aggravating factors: none      The patient is sexually active. Last Pap smear:approximate date 2020 and was normal.    Her relevant past medical history: No past medical history on file. No past surgical history on file. Social History     Occupational History    Not on file   Tobacco Use    Smoking status: Never Smoker    Smokeless tobacco: Never Used   Substance and Sexual Activity    Alcohol use: Yes    Drug use: Not on file    Sexual activity: Not on file     Family History   Problem Relation Age of Onset    Diabetes Mother     Hypertension Mother        Allergies   Allergen Reactions    Aspirin Other (comments)     Reaction Type: Allergy, Upset Stomach     Prior to Admission medications    Medication Sig Start Date End Date Taking? Authorizing Provider   omeprazole (PRILOSEC) 40 mg capsule  12/15/21  Yes Provider, Historical   ferrous sulfate (Iron) 325 mg (65 mg iron) tablet Take 1 Tablet by mouth Daily (before breakfast) for 30 days.  7/5/22 8/4/22 Yes Victoriano Recio MD        Review of Systems - History obtained from the patient  Constitutional: negative for weight loss, fever, night sweats  HEENT: negative for hearing loss, earache, congestion, snoring, sorethroat  CV: negative for chest pain, palpitations, edema  Resp: negative for cough, shortness of breath, wheezing  Breast: negative for breast lumps, nipple discharge, galactorrhea  GI: negative for change in bowel habits, abdominal pain, black or bloody stools  : negative for frequency, dysuria, hematuria  MSK: negative for back pain, joint pain, muscle pain  Skin: negative for itching, rash, hives  Neuro: negative for dizziness, headache, confusion, weakness  Psych: negative for anxiety, depression, change in mood  Heme/lymph: negative for bleeding, bruising, pallor      Objective:    Visit Vitals  /77   Pulse 96   Wt 211 lb 3.2 oz (95.8 kg)   LMP 06/26/2022 (Exact Date)   BMI 35.15 kg/m²          PHYSICAL EXAMINATION    Constitutional  · Appearance: well-nourished, well developed, alert, in no acute distress    HENT  · Head and Face: appears normal      Gastrointestinal  · Abdominal Examination: abdomen non-tender to palpation, normal bowel sounds, no masses present  · Liver and spleen: no hepatomegaly present, spleen not palpable  · Hernias: no hernias identified    Genitourinary  · External Genitalia: normal appearance for age, no discharge present, no tenderness present, no inflammatory lesions present, no masses present, no atrophy present  · Vagina: normal vaginal vault without central or paravaginal defects, no discharge present, no inflammatory lesions present, no masses present  · Bladder: non-tender to palpation  · Urethra: appears normal  · Cervix: normal   · Uterus: normal size, shape and consistency  · Adnexa: no adnexal tenderness present, no adnexal masses present  · Perineum: perineum within normal limits, no evidence of trauma, no rashes or skin lesions present  · Anus: anus within normal limits, no hemorrhoids present  · Inguinal Lymph Nodes: no lymphadenopathy present    Skin  · General Inspection: no rash, no lesions identified    Neurologic/Psychiatric  · Mental Status:  · Orientation: grossly oriented to person, place and time  · Mood and Affect: mood normal, affect appropriate    Assessment:   Regular and heavy bleeding with anemia    Plan:   RTO for an US   Given info and handouts on options to treat her bleeding        Instructions given to pt. Handouts given to pt.

## 2022-07-21 LAB
BASOPHILS # BLD AUTO: 0 X10E3/UL (ref 0–0.2)
BASOPHILS NFR BLD AUTO: 1 %
EOSINOPHIL # BLD AUTO: 0 X10E3/UL (ref 0–0.4)
EOSINOPHIL NFR BLD AUTO: 1 %
ERYTHROCYTE [DISTWIDTH] IN BLOOD BY AUTOMATED COUNT: 24.4 % (ref 11.7–15.4)
HCT VFR BLD AUTO: 29.6 % (ref 34–46.6)
HGB BLD-MCNC: 7.6 G/DL (ref 11.1–15.9)
IMM GRANULOCYTES # BLD AUTO: 0 X10E3/UL (ref 0–0.1)
IMM GRANULOCYTES NFR BLD AUTO: 0 %
LYMPHOCYTES # BLD AUTO: 1.3 X10E3/UL (ref 0.7–3.1)
LYMPHOCYTES NFR BLD AUTO: 31 %
MCH RBC QN AUTO: 18.3 PG (ref 26.6–33)
MCHC RBC AUTO-ENTMCNC: 25.7 G/DL (ref 31.5–35.7)
MCV RBC AUTO: 71 FL (ref 79–97)
MONOCYTES # BLD AUTO: 0.6 X10E3/UL (ref 0.1–0.9)
MONOCYTES NFR BLD AUTO: 13 %
MORPHOLOGY BLD-IMP: ABNORMAL
NEUTROPHILS # BLD AUTO: 2.3 X10E3/UL (ref 1.4–7)
NEUTROPHILS NFR BLD AUTO: 54 %
PATH INTERP BLD-IMP: ABNORMAL
PATH REV BLD -IMP: ABNORMAL
PATHOLOGIST NAME: ABNORMAL
PLATELET # BLD AUTO: 169 X10E3/UL (ref 150–450)
RBC # BLD AUTO: 4.15 X10E6/UL (ref 3.77–5.28)
WBC # BLD AUTO: 4.3 X10E3/UL (ref 3.4–10.8)

## 2022-08-08 ENCOUNTER — OFFICE VISIT (OUTPATIENT)
Dept: OBGYN CLINIC | Age: 47
End: 2022-08-08

## 2022-08-08 VITALS
BODY MASS INDEX: 35.16 KG/M2 | HEART RATE: 81 BPM | DIASTOLIC BLOOD PRESSURE: 80 MMHG | SYSTOLIC BLOOD PRESSURE: 142 MMHG | HEIGHT: 65 IN | WEIGHT: 211 LBS

## 2022-08-08 DIAGNOSIS — N93.9 ABNORMAL UTERINE BLEEDING: Primary | ICD-10-CM

## 2022-08-08 PROCEDURE — 99213 OFFICE O/P EST LOW 20 MIN: CPT | Performed by: OBSTETRICS & GYNECOLOGY

## 2022-08-08 PROCEDURE — 58100 BIOPSY OF UTERUS LINING: CPT | Performed by: OBSTETRICS & GYNECOLOGY

## 2022-08-08 RX ORDER — LANOLIN ALCOHOL/MO/W.PET/CERES
CREAM (GRAM) TOPICAL
COMMUNITY

## 2022-08-08 RX ORDER — LORATADINE 10 MG/1
10 TABLET ORAL
COMMUNITY

## 2022-08-08 RX ORDER — NORETHINDRONE ACETATE AND ETHINYL ESTRADIOL 1.5-30(21)
1 KIT ORAL DAILY
Qty: 3 DOSE PACK | Refills: 2 | Status: SHIPPED | OUTPATIENT
Start: 2022-08-08

## 2022-08-10 NOTE — PROGRESS NOTES
[address] [facility] [address] [place] [address]   Office [phone], Fax [phone]      Chief Complaint:     Chief Complaint   Patient presents with    Anemia     Follow up       Shoaib Dhillon is a 49 y.o. female that presents for anemia follow up    Subjective:   HPI:  Shoaib Dhillon is a 49 y.o. female that presents for follow up of iron deficiency anemia. Her ObGYn gave her Loestrin. She is taking her iron supplements twice daily. She reports feeling much better. Denies blood in stool, vomitus,fainting, light headedness and fatigue. She reports she is planning on getting a colonoscopy soon. She complained of left sided back pain below her ribs extending to her side which presented when she woke up from sleep 3 days ago. It worsens on lying down and improves with Tylenol and sitting up. Pain pulsating in nature, 8/10 and disturbs sleep. Denies burning urination, fever or lifting heavy objects. ROS:   Constitutional: Negative for chills, fever and weight loss. HENT: Negative for congestion, nosebleeds and sore throat. Eyes: Negative for discharge and redness. Respiratory: Negative for cough, hemoptysis and sputum production. Cardiovascular: Negative for chest pain, palpitations and leg swelling. Gastrointestinal: Negative for abdominal pain, nausea and vomiting. Genitourinary: Negative for dysuria. Skin: pain on left lower back below ribs extending to her left side of her body. Past medical history, social history, medications, and allergies personally reviewed. No past medical history on file. Medications:   Current Outpatient Medications   Medication Sig    loratadine (CLARITIN) 10 mg tablet Take 10 mg by mouth. ferrous sulfate 325 mg (65 mg iron) tablet Take  by mouth Daily (before breakfast). norethindrone-ethinyl estradiol-iron (Loestrin Fe 1.5/30, 28-Day,) 1.5 mg-30 mcg (21)/75 mg (7) tab Take 1 Tablet by mouth in the morning.     omeprazole (PRILOSEC) 40 mg capsule      No current facility-administered medications for this visit. Allergies: Allergies   Allergen Reactions    Aspirin Other (comments)     Reaction Type: Allergy, Upset Stomach    Caffeine Other (comments)     GI intolerance        Health Maintenance:  Health Maintenance Due   Topic Date Due    Cervical cancer screen  Never done    Colorectal Cancer Screening Combo  Never done        Objective:   Vitals reviewed. Visit Vitals  /66 (BP 1 Location: Right arm, BP Patient Position: Sitting, BP Cuff Size: Large adult)   Pulse 73   Temp 98.5 °F (36.9 °C) (Oral)   Resp 18   Ht 5' 5\" (1.651 m)   Wt 215 lb (97.5 kg)   LMP 07/26/2022 (Approximate)   SpO2 99%   BMI 35.78 kg/m²        Physical Exam:  General Alert. No distress. Not diaphoretic. No jaundice, cyanosis, pallor. HENT Head Normocephalic and atraumatic. Eyes Conjunctivae pink, no discharge. No scleral icterus. EOMI. Cardio Normal rate, regular rhythm. No murmur, gallop, or friction rub. No chest wall tenderness. Pulmonary Effort normal. Breath sounds normal. No respiratory distress. No wheezes, rales, or rhonchi. No Stridor. Abdominal Soft. Bowel sounds normal. No distension. No tenderness.  Deferred. Extremities No edema of lower extremities. No tenderness. Neurological No focal deficits. Skin Skin is warm and dry. No rash noted. No erythema. Left T10 posterior region tender to light touch, no tenderness to deep palpation. Psychiatric Mood, affect, and judgment normal.        Assessment/Plan:     1. Menorrhagia with regular cycle  -     CBC W/O DIFF; Future  2. Acute left-sided low back pain without sciatica  3. Herpes zoster without complication       Follow up:     1. Menorrhagia with regular cycle: TVUS revealed thickened endometrium of 22 mm with irregular borders. Endometrial biopsy done and pathology is awaited  - CBC W/O DIFF  - Continue Loestrin and iron supplements BID  - Handout for iron rich diet given.   - Follow up with ObGyn for endometrial biopsy results    2. Acute left-sided low back pain without sciatica  - Likely neuropathic 2/2 to shingles    3. Herpes zoster without complication: Neuropathic pain. Tenderness to light touch. > 72 hours since symptom onset, no vesicular rash present. - A prescription script for valacyclovir 1000 mg for 7 days to be taken only if skin rashes appear given  - Handout given      Pt was discussed with Dr. Abbie Centeno MD (attending physician). I have reviewed pertinent labs results and other data. I have discussed the diagnosis with the patient and the intended plan as seen in the above orders. The patient has received an after-visit summary and questions were answered concerning future plans. I have discussed medication side effects and warnings with the patient as well.     Saad Hutchison MD  Resident [facility]  08/11/22

## 2022-08-11 ENCOUNTER — OFFICE VISIT (OUTPATIENT)
Dept: FAMILY MEDICINE CLINIC | Age: 47
End: 2022-08-11
Payer: COMMERCIAL

## 2022-08-11 VITALS
HEART RATE: 73 BPM | SYSTOLIC BLOOD PRESSURE: 110 MMHG | BODY MASS INDEX: 35.82 KG/M2 | TEMPERATURE: 98.5 F | DIASTOLIC BLOOD PRESSURE: 66 MMHG | RESPIRATION RATE: 18 BRPM | WEIGHT: 215 LBS | HEIGHT: 65 IN | OXYGEN SATURATION: 99 %

## 2022-08-11 DIAGNOSIS — B02.9 HERPES ZOSTER WITHOUT COMPLICATION: ICD-10-CM

## 2022-08-11 DIAGNOSIS — N92.0 MENORRHAGIA WITH REGULAR CYCLE: Primary | ICD-10-CM

## 2022-08-11 DIAGNOSIS — M54.50 ACUTE LEFT-SIDED LOW BACK PAIN WITHOUT SCIATICA: ICD-10-CM

## 2022-08-11 PROCEDURE — 99214 OFFICE O/P EST MOD 30 MIN: CPT

## 2022-08-11 NOTE — PROGRESS NOTES
Identified Patient with two Patient identifiers (Name and ). Two Patient Identifiers confirmed. Reviewed record in preparation for visit and have obtained necessary documentation. Chief Complaint   Patient presents with    Anemia     Follow up       Visit Vitals  /66 (BP 1 Location: Right arm, BP Patient Position: Sitting, BP Cuff Size: Large adult)   Pulse 73   Temp 98.5 °F (36.9 °C) (Oral)   Resp 18   Ht 5' 5\" (1.651 m)   Wt 215 lb (97.5 kg)   SpO2 99%   BMI 35.78 kg/m²       1. Have you been to the ER, urgent care clinic since your last visit? Hospitalized since your last visit? No    2. Have you seen or consulted any other health care providers outside of the [address] [place] [address] since your last visit? Include any pap smears or colon screening.  No

## 2022-08-12 LAB
CPT DISCLAIMER: NORMAL
DIAGNOSIS SYNOPSIS:: NORMAL
DX ICD CODE: NORMAL
DX ICD CODE: NORMAL
ERYTHROCYTE [DISTWIDTH] IN BLOOD BY AUTOMATED COUNT: 26.3 % (ref 11.7–15.4)
HCT VFR BLD AUTO: 35.6 % (ref 34–46.6)
HGB BLD-MCNC: 10.1 G/DL (ref 11.1–15.9)
MCH RBC QN AUTO: 22.4 PG (ref 26.6–33)
MCHC RBC AUTO-ENTMCNC: 28.4 G/DL (ref 31.5–35.7)
MCV RBC AUTO: 79 FL (ref 79–97)
MORPHOLOGY BLD-IMP: ABNORMAL
PATH REPORT.FINAL DX SPEC: NORMAL
PATH REPORT.GROSS SPEC: NORMAL
PATH REPORT.SITE OF ORIGIN SPEC: NORMAL
PATHOLOGIST NAME: NORMAL
PAYMENT PROCEDURE: NORMAL
PLATELET # BLD AUTO: 134 X10E3/UL (ref 150–450)
RBC # BLD AUTO: 4.5 X10E6/UL (ref 3.77–5.28)
WBC # BLD AUTO: 4.2 X10E3/UL (ref 3.4–10.8)

## 2022-08-12 RX ORDER — AMOXICILLIN AND CLAVULANATE POTASSIUM 875; 125 MG/1; MG/1
1 TABLET, FILM COATED ORAL EVERY 12 HOURS
Qty: 20 TABLET | Refills: 0 | Status: SHIPPED | OUTPATIENT
Start: 2022-08-12 | End: 2022-08-22

## 2022-08-12 NOTE — PROGRESS NOTES
Your biopsy is benign. No evidence of cancer. It does show mild inflammation which could indicate a mild infection. This could possibly cause some irregular bleeding. I will send a prescription to your pharmacy for an antibiotic.

## 2022-11-07 ENCOUNTER — OFFICE VISIT (OUTPATIENT)
Dept: FAMILY MEDICINE CLINIC | Age: 47
End: 2022-11-07
Payer: COMMERCIAL

## 2022-11-07 VITALS
BODY MASS INDEX: 36.11 KG/M2 | WEIGHT: 217 LBS | DIASTOLIC BLOOD PRESSURE: 70 MMHG | OXYGEN SATURATION: 98 % | HEART RATE: 71 BPM | SYSTOLIC BLOOD PRESSURE: 115 MMHG

## 2022-11-07 DIAGNOSIS — Z12.31 ENCOUNTER FOR SCREENING MAMMOGRAM FOR BREAST CANCER: ICD-10-CM

## 2022-11-07 DIAGNOSIS — N92.0 MENORRHAGIA WITH REGULAR CYCLE: ICD-10-CM

## 2022-11-07 DIAGNOSIS — D64.9 ANEMIA, UNSPECIFIED TYPE: Primary | ICD-10-CM

## 2022-11-07 PROCEDURE — 99213 OFFICE O/P EST LOW 20 MIN: CPT | Performed by: STUDENT IN AN ORGANIZED HEALTH CARE EDUCATION/TRAINING PROGRAM

## 2022-11-08 LAB
ERYTHROCYTE [DISTWIDTH] IN BLOOD BY AUTOMATED COUNT: 12.8 % (ref 11.7–15.4)
HCT VFR BLD AUTO: 39.8 % (ref 34–46.6)
HGB BLD-MCNC: 12.3 G/DL (ref 11.1–15.9)
MCH RBC QN AUTO: 27 PG (ref 26.6–33)
MCHC RBC AUTO-ENTMCNC: 30.9 G/DL (ref 31.5–35.7)
MCV RBC AUTO: 87 FL (ref 79–97)
PLATELET # BLD AUTO: 134 X10E3/UL (ref 150–450)
RBC # BLD AUTO: 4.56 X10E6/UL (ref 3.77–5.28)
WBC # BLD AUTO: 4.5 X10E3/UL (ref 3.4–10.8)

## 2022-11-12 NOTE — PROGRESS NOTES
[address] [facility] [address] [place] [address]   Office [phone], Fax [phone]      Chief Complaint:   Eloisa Marie is a 49 y.o. female that presents for:     Chief Complaint   Patient presents with    Follow Up Chronic Condition     Subjective:   HPI:  Eloisa Marie is a 49 y.o. female who presents to clinic for follow up of Anemia. She says that she was recently started on OCPs for irregular menses and she had a recent Endometrial Biopsy which was normal. She had a TVUS which did not show fibroids but showed a thickened endometrial stirp hence the Endometrial Biopsy. She says that her irregular menses have improved after starting the OCPs. She says that she used to have about 10 days of heavy bleeding but her most recent menses after the OCPs had lighter flow. She has also been compliant with Iron Supplmentation. Health Maintenance:  Health Maintenance Due   Topic Date Due    Cervical cancer screen  Never done    Colorectal Cancer Screening Combo  Never done    COVID-19 Vaccine (4 - Booster for Pfizer series) 12/15/2021    Flu Vaccine (1) Never done      ROS:   Review of Systems   Constitutional:  Negative for chills and fever. Respiratory:  Negative for cough and shortness of breath. Cardiovascular:  Negative for chest pain and leg swelling. Gastrointestinal:  Negative for abdominal pain, constipation, diarrhea, nausea and vomiting. Genitourinary:  Negative for dysuria, frequency and urgency. Skin:  Negative for itching and rash. Neurological:  Negative for dizziness and headaches. Past medical history, social history, and medications personally reviewed. No past medical history on file. Allergies personally reviewed. Allergies   Allergen Reactions    Aspirin Other (comments)     Reaction Type: Allergy, Upset Stomach    Caffeine Other (comments)     GI intolerance      Objective:   Vitals reviewed.   Visit Vitals  /70 (BP 1 Location: Left arm, BP Patient Position: Sitting, BP Cuff Size: Adult)   Pulse 71   Wt 217 lb (98.4 kg)   SpO2 98%   BMI 36.11 kg/m²      Physical Exam  Vitals Reviewed. General AO x 3. No distress. Not diaphoretic. No jaundice. No cyanosis. No pallor. Cardio Normal rate, regular rhythm. No murmur, rubs, or gallop. Pulmonary Effort normal. No accessory muscle use. No wheezes, rales, or rhonchi. Abdominal Soft. Bowel sounds normal. No tenderness. No distension. Assessment/Plan:   Diagnoses and all orders for this visit:    1. Anemia, unspecified type: last Hb (8/2022) 10.1, improved from 7.6 in 7/2022, last Iron Profile (7/2022) with Iron wnl, TIBC wnl, Iron % sat wnl, and Ferritin low at 11  - Check CBC today  - Continue Iron supplementation, will plan to recheck Iron Profile with Ferritin in 2 months    2. Menorrhagia with regular cycle: improved with OCPs, TVUS without fibroids but with thickened endometrial strip, subsequent Endometrial Biopsy within normal limits  - Continue OCPs  - Management per ObGyn    3. Encounter for screening mammogram for breast cancer  -     [facility] MAMMO BI SCREENING INCL CAD; Future     Follow up: Follow-up and Dispositions    Return in about 3 months (around 2/7/2023) for Follow up Anemia. Pt was discussed with Dr. Kelsea Wong (attending physician). I have reviewed pertinent labs results and other data. I have discussed the diagnosis with the patient and the intended plan as seen in the above orders. The patient has received an after-visit summary and questions were answered concerning future plans. I have discussed medication side effects and warnings with the patient as well.     Martina Collet, MD  Resident [facility]  11/07/22

## 2022-11-14 NOTE — PROGRESS NOTES
[address] Medicine Residency Attending Addendum:  Patient encounter was discussed on the day of the encounter with Lola Estes MD, performing the key elements of the service. I discussed the findings, assessment and plan with the resident and agree with the resident's findings and plan as documented in the resident's note.       Jude Khoury MD, CAQSM, RMSK

## 2022-12-28 ENCOUNTER — HOSPITAL ENCOUNTER (OUTPATIENT)
Dept: MAMMOGRAPHY | Age: 47
Discharge: HOME | End: 2022-12-28
Attending: STUDENT IN AN ORGANIZED HEALTH CARE EDUCATION/TRAINING PROGRAM
Payer: COMMERCIAL

## 2022-12-28 DIAGNOSIS — Z12.31 ENCOUNTER FOR SCREENING MAMMOGRAM FOR BREAST CANCER: ICD-10-CM

## 2022-12-28 PROCEDURE — 77067 SCR MAMMO BI INCL CAD: CPT

## 2023-03-29 ENCOUNTER — NURSE TRIAGE (OUTPATIENT)
Dept: OTHER | Facility: CLINIC | Age: 48
End: 2023-03-29

## 2023-04-06 ENCOUNTER — OFFICE VISIT (OUTPATIENT)
Dept: FAMILY MEDICINE CLINIC | Age: 48
End: 2023-04-06

## 2023-04-06 NOTE — PROGRESS NOTES
Subjective   [address] Chrystal Douglas is a 51 y.o. female who presents for pain in legs for 4 months. When she lays down she feels numbness at the forefeet bilaterally. Has pain in this area when she is not laying down. Nothing makes it worse other than lying down. Nothing improves it. Worse on the right than left. Has felt a similar sensation previously in her hands but this has not been the case for months now. No neck or back pain. No weakness. No injury. When walking calves hurt. Rest does not always relieve the pain in her calves. Bending over when walking does not improve pain. [facility]  1. Degenerative changes of the spine without significant spinal canal stenosis at any level. Neural foraminal stenosis is most prominent at the right L4-5 with mild-to-moderate stenosis. 2.  Low T1, high T2 signal lesion at the L2 vertebral body may represent an atypical hemangioma or indeterminate lesion. Recommend follow-up lumbar spine MRI and/or CT in 6 months to document stability. Past Medical History  No past medical history on file. Current Medications  Current Outpatient Medications   Medication Sig Dispense Refill    ferrous sulfate 325 mg (65 mg iron) tablet Take  by mouth Daily (before breakfast). norethindrone-ethinyl estradiol-iron (Loestrin Fe 1.5/30, 28-Day,) 1.5 mg-30 mcg (21)/75 mg (7) tab Take 1 Tablet by mouth in the morning. 3 Dose Pack 2    omeprazole (PRILOSEC) 40 mg capsule       loratadine (CLARITIN) 10 mg tablet Take 10 mg by mouth. (Patient not taking: Reported on 4/6/2023)           Objective   Vital Signs  Visit Vitals  /65   Pulse 67   Temp 98.4 °F (36.9 °C) (Temporal)   Resp 17   Ht 5' 5\" (1.651 m)   Wt 224 lb (101.6 kg)   SpO2 99%   BMI 37.28 kg/m²       Physical Examination  Physical Exam  Cardiovascular:      Rate and Rhythm: Normal rate and regular rhythm. Pulses:           Popliteal pulses are 2+ on the right side and 2+ on the left side. Dorsalis pedis pulses are 1+ on the right side and 1+ on the left side. Posterior tibial pulses are 1+ on the right side and 1+ on the left side. Heart sounds: Normal heart sounds. Pulmonary:      Effort: Pulmonary effort is normal.      Breath sounds: Normal breath sounds. Musculoskeletal:      Lumbar back: Normal. No bony tenderness. Normal range of motion. Negative right straight leg raise test and negative left straight leg raise test.      Right lower leg: No edema. Left lower leg: No edema. Feet:      Right foot:      Protective Sensation: 3 sites tested. 3 sites sensed. Skin integrity: Skin integrity normal.      Left foot:      Protective Sensation: 3 sites tested. 3 sites sensed. Skin integrity: Skin integrity normal.      Comments: Babinski reflex intact, proprioception intact       Assessment   Dorothy Dunn is a 51 y.o. who is here for bilateral foot paresthesias associated with intermittent bilateral lower leg cramps. Physical exam notable for diminished pulses in distal LE suggesting vascular claudication. However lack of improvement with rest negates this. Worsening with supine position and paresthesias suggest neurogenic claudication vs spinal stenosis. Neurogenic cause more warrants eval with MRI particularly given abnormal lesion on prior MRI in 2021. History of prediabetes. Will work up both causes as other per below. Plan   Diagnoses and all orders for this visit:    1. Neuropathy involving both lower extremities: with leg cramping. L2 vertebral lesion noted on MRI in 12/2021 that suggest repeat MRI.    -     CBC WITH AUTOMATED DIFF; Future  -     METABOLIC PANEL, BASIC; Future  -     ANKLE BRACHIAL INDEX; Future  -     HEMOGLOBIN A1C WITH EAG; Future  -     MRI LUMB SPINE W WO CONT; Future  -     VITAMIN B12; Future  -     IRON PROFILE; Future  -     FERRITIN; Future  -     SED RATE (ESR); Future  -     C REACTIVE PROTEIN, QT; Future    2. Thrombocytopenia ([facility] [address]): noted on prior labs  -     PATHOLOGIST REVIEW SMEARS       Follow up in 2-3 weeks to review above testing      I discussed this patient with Dr. Chelsi Sandoval (Attending Physician)       Signed By:  Tamara Walker DO    Family Medicine Resident

## 2023-04-06 NOTE — PROGRESS NOTES
Chief Complaint   Patient presents with    Numbness     Both feet    Leg Pain     bilateral     1. Have you been to the ER, urgent care clinic since your last visit? Hospitalized since your last visit? No    2. Have you seen or consulted any other health care providers outside of the [address] [place] [address] since your last visit? Include any pap smears or colon screening.  No

## 2023-04-06 NOTE — PROGRESS NOTES
I reviewed with the resident the medical history and the resident's findings on the physical examination. I discussed with the resident the patient's diagnosis and concur with the plan. Pain in bilateral lower extremities, no back pain. Old imaging shows possible hemangioma which requires follow up. Exam with diminished DP/PT bilaterally. Repeat MRI, obtain ABIs. Check CBC, B12, ferritin, A1c, ESR, CRP.

## 2023-04-17 LAB
BASOPHILS # BLD AUTO: 0 X10E3/UL (ref 0–0.2)
BASOPHILS NFR BLD AUTO: 1 %
EOSINOPHIL # BLD AUTO: 0 X10E3/UL (ref 0–0.4)
EOSINOPHIL NFR BLD AUTO: 1 %
ERYTHROCYTE [DISTWIDTH] IN BLOOD BY AUTOMATED COUNT: 12.8 % (ref 11.7–15.4)
HCT VFR BLD AUTO: 40.8 % (ref 34–46.6)
HGB BLD-MCNC: 12.9 G/DL (ref 11.1–15.9)
IMM GRANULOCYTES # BLD AUTO: 0 X10E3/UL (ref 0–0.1)
IMM GRANULOCYTES NFR BLD AUTO: 0 %
LYMPHOCYTES # BLD AUTO: 1.1 X10E3/UL (ref 0.7–3.1)
LYMPHOCYTES NFR BLD AUTO: 26 %
MCH RBC QN AUTO: 27.3 PG (ref 26.6–33)
MCHC RBC AUTO-ENTMCNC: 31.6 G/DL (ref 31.5–35.7)
MCV RBC AUTO: 86 FL (ref 79–97)
MONOCYTES # BLD AUTO: 0.4 X10E3/UL (ref 0.1–0.9)
MONOCYTES NFR BLD AUTO: 10 %
NEUTROPHILS # BLD AUTO: 2.6 X10E3/UL (ref 1.4–7)
NEUTROPHILS NFR BLD AUTO: 62 %
PATH REV BLD -IMP: NORMAL
PATHOLOGIST NAME: NORMAL
PLATELET # BLD AUTO: 178 X10E3/UL (ref 150–450)
RBC # BLD AUTO: 4.72 X10E6/UL (ref 3.77–5.28)
WBC # BLD AUTO: 4.1 X10E3/UL (ref 3.4–10.8)

## 2023-04-18 ENCOUNTER — HOSPITAL ENCOUNTER (OUTPATIENT)
Dept: VASCULAR SURGERY | Age: 48
Discharge: HOME | End: 2023-04-18
Attending: STUDENT IN AN ORGANIZED HEALTH CARE EDUCATION/TRAINING PROGRAM
Payer: COMMERCIAL

## 2023-04-18 ENCOUNTER — HOSPITAL ENCOUNTER (OUTPATIENT)
Dept: MRI IMAGING | Age: 48
Discharge: HOME | End: 2023-04-18
Attending: STUDENT IN AN ORGANIZED HEALTH CARE EDUCATION/TRAINING PROGRAM
Payer: COMMERCIAL

## 2023-04-18 VITALS — BODY MASS INDEX: 37.28 KG/M2 | WEIGHT: 224 LBS

## 2023-04-18 DIAGNOSIS — G57.93 NEUROPATHY INVOLVING BOTH LOWER EXTREMITIES: ICD-10-CM

## 2023-04-18 LAB
LEFT ABI: 1.27
LEFT ARM BP: 151 MMHG
LEFT LOW THIGH PRESSURE: 253 MMHG
LEFT LOW THIGH PRESSURE: 69 MMHG
LEFT POSTERIOR TIBIAL: 187 MMHG
LEFT TBI: 0.4
LEFT TOE PRESSURE: 60 MMHG
RIGHT ABI: 1.2
RIGHT ARM BP: 151 MMHG
RIGHT LOW THIGH PRESSURE: 224 MMHG
RIGHT LOW THIGH PRESSURE: 68 MMHG
RIGHT POSTERIOR TIBIAL: 181 MMHG
RIGHT TBI: 0.46
RIGHT TOE PRESSURE: 70 MMHG
VAS LEFT DORSALIS PEDIS BP: 192 MMHG
VAS RIGHT DORSALIS PEDIS BP: 179 MMHG

## 2023-04-18 PROCEDURE — 74011250636 HC RX REV CODE- 250/636: Performed by: RADIOLOGY

## 2023-04-18 PROCEDURE — 72158 MRI LUMBAR SPINE W/O & W/DYE: CPT

## 2023-04-18 PROCEDURE — 93922 UPR/L XTREMITY ART 2 LEVELS: CPT

## 2023-04-18 PROCEDURE — A9576 INJ PROHANCE MULTIPACK: HCPCS | Performed by: RADIOLOGY

## 2023-04-18 PROCEDURE — 93923 UPR/LXTR ART STDY 3+ LVLS: CPT

## 2023-04-18 RX ADMIN — GADOTERIDOL 20 ML: 279.3 INJECTION, SOLUTION INTRAVENOUS at 15:09

## 2023-04-24 ENCOUNTER — OFFICE VISIT (OUTPATIENT)
Dept: FAMILY MEDICINE CLINIC | Age: 48
End: 2023-04-24
Payer: COMMERCIAL

## 2023-04-24 VITALS
DIASTOLIC BLOOD PRESSURE: 72 MMHG | SYSTOLIC BLOOD PRESSURE: 125 MMHG | HEART RATE: 66 BPM | OXYGEN SATURATION: 100 % | WEIGHT: 226 LBS | RESPIRATION RATE: 16 BRPM | BODY MASS INDEX: 37.61 KG/M2

## 2023-04-24 DIAGNOSIS — G57.93 NEUROPATHY INVOLVING BOTH LOWER EXTREMITIES: ICD-10-CM

## 2023-04-24 DIAGNOSIS — M48.062 SPINAL STENOSIS OF LUMBAR REGION WITH NEUROGENIC CLAUDICATION: Primary | ICD-10-CM

## 2023-04-24 DIAGNOSIS — I73.9 PAD (PERIPHERAL ARTERY DISEASE) (HCC): ICD-10-CM

## 2023-04-24 DIAGNOSIS — Z79.899 CONTROLLED SUBSTANCE AGREEMENT SIGNED: ICD-10-CM

## 2023-04-24 PROCEDURE — 99213 OFFICE O/P EST LOW 20 MIN: CPT | Performed by: STUDENT IN AN ORGANIZED HEALTH CARE EDUCATION/TRAINING PROGRAM

## 2023-04-24 RX ORDER — GABAPENTIN 100 MG/1
100 CAPSULE ORAL
Qty: 30 CAPSULE | Refills: 0 | Status: SHIPPED | OUTPATIENT
Start: 2023-04-24

## 2023-04-24 NOTE — PROGRESS NOTES
Subjective   2229 Chrystal Douglas is a 51 y.o. female who presents for neuropathy of both feet. Originally seen on 4/6 for this. Pain come and goes, describes as hot water on feet. Still worse at night when laying down, better when sitting. Exacerbated also by exertion at times. No back pain. Work up from last visit:  - labs unremarkable (ESR, CRP, B12, A1c)  - ABIs notable for moderate disease of the right lower extremity  - MRI with resolution of previous L2 lesion, moderate to severe L5-S1 foraminal stenosis     Past Medical History  No past medical history on file. Current Medications  Current Outpatient Medications   Medication Sig Dispense Refill    gabapentin (NEURONTIN) 100 mg capsule Take 1 Capsule by mouth nightly. Max Daily Amount: 100 mg. 30 Capsule 0    ferrous sulfate 325 mg (65 mg iron) tablet Take  by mouth Daily (before breakfast). norethindrone-ethinyl estradiol-iron (Loestrin Fe 1.5/30, 28-Day,) 1.5 mg-30 mcg (21)/75 mg (7) tab Take 1 Tablet by mouth in the morning. 3 Dose Pack 2    omeprazole (PRILOSEC) 40 mg capsule       loratadine (CLARITIN) 10 mg tablet Take 10 mg by mouth. (Patient not taking: Reported on 4/6/2023)           Objective   Vital Signs  Visit Vitals  /72 (BP 1 Location: Left arm, BP Patient Position: Sitting, BP Cuff Size: Large adult)   Pulse 66   Resp 16   Wt 226 lb (102.5 kg)   SpO2 100%   BMI 37.61 kg/m²       Physical Examination  Physical Exam  Cardiovascular:      Rate and Rhythm: Normal rate. Pulmonary:      Effort: Pulmonary effort is normal.   Musculoskeletal:      Lumbar back: Negative right straight leg raise test and negative left straight leg raise test.      Right lower leg: No edema. Left lower leg: No edema. Feet:      Right foot:      Protective Sensation: 3 sites tested. 3 sites sensed. Left foot:      Protective Sensation: 3 sites tested. 3 sites sensed. Neurological:      Mental Status: She is alert.       Deep Tendon Reflexes:      Reflex Scores:       Patellar reflexes are 2+ on the right side and 2+ on the left side. Achilles reflexes are 2+ on the right side and 2+ on the left side. Assessment   2229 Chrystal Douglas is a 51 y.o. who is here for neuropathic pain of dorsal aspect of both feet. Unclearly etiology however there is evidence of spinal stenosis that likely is contributory and evidence of PAD (however this only affects the right LE). Will treat both conditions as well as refer to neurology for EMG testing. Plan   Diagnoses and all orders for this visit:    1. Spinal stenosis of lumbar region with neurogenic claudication  -     REFERRAL TO PHYSICAL THERAPY    2. Neuropathy involving both lower extremities  -     gabapentin (NEURONTIN) 100 mg capsule; Take 1 Capsule by mouth nightly. Max Daily Amount: 100 mg.  -     REFERRAL TO NEUROLOGY    3. PAD (peripheral artery disease) ([facility] [address]  - encouraged to walk 30 minutes daily  - can consider PDE inhibitor if no improvement with exercise program    4.  Controlled substance agreement signed  -     7-DRUG SCREEN UNBUND, UR; Future       Follow up in 4 weeks     I discussed this patient with Dr. Compa Burgess (Attending Physician)       Signed By:  Velma Graves DO    Family Medicine Resident

## 2023-04-25 NOTE — PROGRESS NOTES
I discussed the findings, assessment and plan with the resident and agree with the resident's findings and plan as documented in the resident's note. Patient admitted to medical unit, sitting in bed, alert and oriented x 4, orientation to room performed, call bell within reach, seizure precautions initiated, will continue to monitor patient.

## 2023-04-26 LAB
AMPHETAMINES UR QL SCN: NEGATIVE NG/ML
BARBITURATES UR QL SCN: NEGATIVE NG/ML
BENZODIAZ UR QL: NEGATIVE NG/ML
BZE UR QL: NEGATIVE NG/ML
CANNABINOIDS UR QL SCN: NEGATIVE NG/ML
OPIATES UR QL: NEGATIVE NG/ML
PCP UR QL: NEGATIVE NG/ML

## 2023-06-07 DIAGNOSIS — G57.93 UNSPECIFIED MONONEUROPATHY OF BILATERAL LOWER LIMBS: Primary | ICD-10-CM

## 2023-06-07 RX ORDER — GABAPENTIN 100 MG/1
100 CAPSULE ORAL DAILY
Qty: 90 CAPSULE | Refills: 0 | Status: SHIPPED | OUTPATIENT
Start: 2023-06-07 | End: 2023-09-05

## 2023-12-08 ENCOUNTER — TRANSCRIBE ORDERS (OUTPATIENT)
Facility: HOSPITAL | Age: 48
End: 2023-12-08

## 2023-12-08 DIAGNOSIS — Z12.31 VISIT FOR SCREENING MAMMOGRAM: Primary | ICD-10-CM

## 2023-12-29 ENCOUNTER — HOSPITAL ENCOUNTER (OUTPATIENT)
Facility: HOSPITAL | Age: 48
Discharge: HOME | End: 2023-12-29
Attending: STUDENT IN AN ORGANIZED HEALTH CARE EDUCATION/TRAINING PROGRAM
Payer: COMMERCIAL

## 2023-12-29 VITALS — WEIGHT: 235 LBS | BODY MASS INDEX: 39.15 KG/M2 | HEIGHT: 65 IN

## 2023-12-29 DIAGNOSIS — Z12.31 VISIT FOR SCREENING MAMMOGRAM: ICD-10-CM

## 2023-12-29 PROCEDURE — 77063 BREAST TOMOSYNTHESIS BI: CPT

## 2024-05-17 ENCOUNTER — APPOINTMENT (OUTPATIENT)
Dept: VASCULAR SURGERY | Facility: HOSPITAL | Age: 49
End: 2024-05-17
Payer: COMMERCIAL

## 2024-05-17 ENCOUNTER — HOSPITAL ENCOUNTER (EMERGENCY)
Facility: HOSPITAL | Age: 49
Discharge: HOME | End: 2024-05-17
Attending: EMERGENCY MEDICINE
Payer: COMMERCIAL

## 2024-05-17 VITALS
DIASTOLIC BLOOD PRESSURE: 91 MMHG | HEIGHT: 65 IN | WEIGHT: 236 LBS | HEART RATE: 79 BPM | OXYGEN SATURATION: 100 % | RESPIRATION RATE: 16 BRPM | BODY MASS INDEX: 39.32 KG/M2 | TEMPERATURE: 99 F | SYSTOLIC BLOOD PRESSURE: 148 MMHG

## 2024-05-17 DIAGNOSIS — M79.661 RIGHT CALF PAIN: Primary | ICD-10-CM

## 2024-05-17 DIAGNOSIS — D50.9 IRON DEFICIENCY ANEMIA, UNSPECIFIED IRON DEFICIENCY ANEMIA TYPE: ICD-10-CM

## 2024-05-17 LAB
ALBUMIN SERPL-MCNC: 3.3 G/DL (ref 3.5–5)
ALBUMIN/GLOB SERPL: 0.8 (ref 1.1–2.2)
ALP SERPL-CCNC: 72 U/L (ref 45–117)
ALT SERPL-CCNC: 19 U/L (ref 12–78)
ANION GAP SERPL CALC-SCNC: 0 MMOL/L (ref 5–15)
AST SERPL-CCNC: 44 U/L (ref 15–37)
BASOPHILS # BLD: 0.1 K/UL (ref 0–0.1)
BASOPHILS NFR BLD: 1 % (ref 0–1)
BILIRUB SERPL-MCNC: 0.5 MG/DL (ref 0.2–1)
BUN SERPL-MCNC: 15 MG/DL (ref 6–20)
BUN/CREAT SERPL: 20 (ref 12–20)
CALCIUM SERPL-MCNC: 8.6 MG/DL (ref 8.5–10.1)
CHLORIDE SERPL-SCNC: 111 MMOL/L (ref 97–108)
CO2 SERPL-SCNC: 26 MMOL/L (ref 21–32)
COMMENT:: NORMAL
CREAT SERPL-MCNC: 0.74 MG/DL (ref 0.55–1.02)
DIFFERENTIAL METHOD BLD: ABNORMAL
EOSINOPHIL # BLD: 0.1 K/UL (ref 0–0.4)
EOSINOPHIL NFR BLD: 1 % (ref 0–7)
ERYTHROCYTE [DISTWIDTH] IN BLOOD BY AUTOMATED COUNT: 18.8 % (ref 11.5–14.5)
GLOBULIN SER CALC-MCNC: 4.3 G/DL (ref 2–4)
GLUCOSE SERPL-MCNC: 88 MG/DL (ref 65–100)
HCT VFR BLD AUTO: 23.6 % (ref 35–47)
HGB BLD-MCNC: 7 G/DL (ref 11.5–16)
IMM GRANULOCYTES # BLD AUTO: 0 K/UL (ref 0–0.04)
IMM GRANULOCYTES NFR BLD AUTO: 0 % (ref 0–0.5)
LYMPHOCYTES # BLD: 1.6 K/UL (ref 0.8–3.5)
LYMPHOCYTES NFR BLD: 25 % (ref 12–49)
MCH RBC QN AUTO: 20.3 PG (ref 26–34)
MCHC RBC AUTO-ENTMCNC: 29.7 G/DL (ref 30–36.5)
MCV RBC AUTO: 68.4 FL (ref 80–99)
MONOCYTES # BLD: 0.8 K/UL (ref 0–1)
MONOCYTES NFR BLD: 13 % (ref 5–13)
NEUTS SEG # BLD: 3.9 K/UL (ref 1.8–8)
NEUTS SEG NFR BLD: 60 % (ref 32–75)
NRBC # BLD: 0 K/UL (ref 0–0.01)
NRBC BLD-RTO: 0 PER 100 WBC
PLATELET # BLD AUTO: 328 K/UL (ref 150–400)
POTASSIUM SERPL-SCNC: 4.8 MMOL/L (ref 3.5–5.1)
PROT SERPL-MCNC: 7.6 G/DL (ref 6.4–8.2)
RBC # BLD AUTO: 3.45 M/UL (ref 3.8–5.2)
RBC MORPH BLD: ABNORMAL
SODIUM SERPL-SCNC: 137 MMOL/L (ref 136–145)
SPECIMEN HOLD: NORMAL
WBC # BLD AUTO: 6.5 K/UL (ref 3.6–11)

## 2024-05-17 PROCEDURE — 85025 COMPLETE CBC W/AUTO DIFF WBC: CPT

## 2024-05-17 PROCEDURE — 93971 EXTREMITY STUDY: CPT

## 2024-05-17 PROCEDURE — 99284 EMERGENCY DEPT VISIT MOD MDM: CPT

## 2024-05-17 PROCEDURE — 80053 COMPREHEN METABOLIC PANEL: CPT

## 2024-05-17 PROCEDURE — 36415 COLL VENOUS BLD VENIPUNCTURE: CPT

## 2024-05-17 PROCEDURE — 93005 ELECTROCARDIOGRAM TRACING: CPT | Performed by: EMERGENCY MEDICINE

## 2024-05-17 ASSESSMENT — PAIN DESCRIPTION - ORIENTATION: ORIENTATION: RIGHT

## 2024-05-17 ASSESSMENT — PAIN - FUNCTIONAL ASSESSMENT: PAIN_FUNCTIONAL_ASSESSMENT: 0-10

## 2024-05-17 ASSESSMENT — PAIN DESCRIPTION - DESCRIPTORS: DESCRIPTORS: TIGHTNESS;ACHING

## 2024-05-17 ASSESSMENT — PAIN SCALES - GENERAL: PAINLEVEL_OUTOF10: 4

## 2024-05-17 NOTE — ED PROVIDER NOTES
Never    Smokeless tobacco: Never   Substance and Sexual Activity    Alcohol use: Yes     Social Determinants of Health     Intimate Partner Violence: Not At Risk (7/17/2022)    Humiliation, Afraid, Rape, and Kick questionnaire     Fear of Current or Ex-Partner: No     Emotionally Abused: No     Physically Abused: No     Sexually Abused: No           PHYSICAL EXAM    (up to 7 for level 4, 8 or more for level 5)     ED Triage Vitals [05/17/24 1855]   BP Temp Temp Source Pulse Respirations SpO2 Height Weight - Scale   (!) 148/91 99 °F (37.2 °C) Oral 79 16 100 % 1.651 m (5' 5\") 107 kg (236 lb)       Body mass index is 39.27 kg/m².    Physical Exam    DIAGNOSTIC RESULTS     EKG: All EKG's are interpreted by the Emergency Department Physician who either signs or Co-signs this chart in the absence of a cardiologist.        RADIOLOGY:   Non-plain film images such as CT, Ultrasound and MRI are read by the radiologist. Plain radiographic images are visualized and preliminarily interpreted by the emergency physician with the below findings:        Interpretation per the Radiologist below, if available at the time of this note:    No orders to display        LABS:  Labs Reviewed   CBC WITH AUTO DIFFERENTIAL   COMPREHENSIVE METABOLIC PANEL   EXTRA TUBES HOLD       All other labs were within normal range or not returned as of this dictation.    EMERGENCY DEPARTMENT COURSE and DIFFERENTIAL DIAGNOSIS/MDM:   Vitals:    Vitals:    05/17/24 1855   BP: (!) 148/91   Pulse: 79   Resp: 16   Temp: 99 °F (37.2 °C)   TempSrc: Oral   SpO2: 100%   Weight: 107 kg (236 lb)   Height: 1.651 m (5' 5\")           Medical Decision Making  Amount and/or Complexity of Data Reviewed  Labs: ordered.  ECG/medicine tests: ordered. Decision-making details documented in ED Course.            REASSESSMENT     ED Course as of 05/17/24 1923   Fri May 17, 2024   1905 EKG 12 Lead  NSR, 77 BPM, normal axis, normal intervals, no ST or T wave changes, no ectopy [IO]  Skin is warm and dry.      Capillary Refill: Capillary refill takes less than 2 seconds.   Neurological:      Mental Status: She is alert and oriented to person, place, and time.   Psychiatric:         Mood and Affect: Mood normal.         Behavior: Behavior normal.         DIAGNOSTIC RESULTS     EKG: All EKG's are interpreted by the Emergency Department Physician who either signs or Co-signs this chart in the absence of a cardiologist.        RADIOLOGY:   Non-plain film images such as CT, Ultrasound and MRI are read by the radiologist. Plain radiographic images are visualized and preliminarily interpreted by the emergency physician with the below findings:        Interpretation per the Radiologist below, if available at the time of this note:    Vascular duplex lower extremity venous right   Final Result           LABS:  Labs Reviewed   CBC WITH AUTO DIFFERENTIAL - Abnormal; Notable for the following components:       Result Value    RBC 3.45 (*)     Hemoglobin 7.0 (*)     Hematocrit 23.6 (*)     MCV 68.4 (*)     MCH 20.3 (*)     MCHC 29.7 (*)     RDW 18.8 (*)     All other components within normal limits   COMPREHENSIVE METABOLIC PANEL - Abnormal; Notable for the following components:    Chloride 111 (*)     Anion Gap 0 (*)     AST 44 (*)     Albumin 3.3 (*)     Globulin 4.3 (*)     Albumin/Globulin Ratio 0.8 (*)     All other components within normal limits   EXTRA TUBES HOLD       All other labs were within normal range or not returned as of this dictation.    EMERGENCY DEPARTMENT COURSE and DIFFERENTIAL DIAGNOSIS/MDM:   Vitals:    Vitals:    05/17/24 1855   BP: (!) 148/91   Pulse: 79   Resp: 16   Temp: 99 °F (37.2 °C)   TempSrc: Oral   SpO2: 100%   Weight: 107 kg (236 lb)   Height: 1.651 m (5' 5\")           Medical Decision Making  49-year-old female with history of iron deficiency anemia reports to ED upon referral from urgent care to rule out DVT due to RLE swelling and pain over past 10 days.  Differentials

## 2024-05-17 NOTE — ED TRIAGE NOTES
Pt arrives to the ED with complaints of RLE swelling and pain for ten days, patient went to urgent care who referred her to the ED for concerns of a DVT. Pt not taking blood thinners, no hx blood clots, no recent travel.

## 2024-05-18 NOTE — DISCHARGE INSTRUCTIONS
We did not find any concerning abnormalities in your work up today regarding your leg pain, but as discussed, we did see that your hemoglobin is 7.0.  Please continue your iron supplements as previously prescribed and follow-up with your primary care given your visit today here.  We are discussing with case management about the possibility of you, as per your primary care physician's preference, for receiving outpatient blood transfusion given your anemia.  If symptoms worsen or new concerning symptoms arise, please report to the nearest emergency department.    Thank you for allowing us to provide you with medical care today.  We realize that you have many choices for your emergency care needs.  We thank you for choosing [facility].  Please choose us in the future for any continued health care needs.     The exam and treatment you received in the Emergency Department were for an emergent problem and are not intended as complete care. It is important that you follow up with a doctor, nurse practitioner, or physician's assistant for ongoing care. If your symptoms worsen or you do not improve as expected and you are unable to reach your usual health care provider, you should return to the Emergency Department.  We are available 24 hours a day.     Please make an appointment with your health care provider(s) for follow up of your Emergency Department visit.  Take this sheet with you when you go to your follow-up visit.

## 2024-05-19 LAB
ECHO BSA: 2.22 M2
EKG ATRIAL RATE: 77 BPM
EKG DIAGNOSIS: NORMAL
EKG P AXIS: 60 DEGREES
EKG P-R INTERVAL: 160 MS
EKG Q-T INTERVAL: 384 MS
EKG QRS DURATION: 72 MS
EKG QTC CALCULATION (BAZETT): 434 MS
EKG R AXIS: 16 DEGREES
EKG T AXIS: 17 DEGREES
EKG VENTRICULAR RATE: 77 BPM

## 2024-05-19 PROCEDURE — 93010 ELECTROCARDIOGRAM REPORT: CPT | Performed by: SPECIALIST

## 2024-05-19 PROCEDURE — 93971 EXTREMITY STUDY: CPT

## 2024-05-20 ASSESSMENT — ENCOUNTER SYMPTOMS
BACK PAIN: 0
SHORTNESS OF BREATH: 0
ABDOMINAL PAIN: 0

## 2024-06-02 PROBLEM — D69.6 THROMBOCYTOPENIA, UNSPECIFIED (HCC): Status: ACTIVE | Noted: 2024-06-02

## 2024-06-02 PROBLEM — I73.9 PERIPHERAL VASCULAR DISEASE, UNSPECIFIED (HCC): Status: ACTIVE | Noted: 2024-06-02

## 2024-06-02 NOTE — PROGRESS NOTES
[address] [place] [address]   Office [phone], Fax [phone]    Chief Complaint     Chief Complaint   Patient presents with    Follow-Up from Hospital       Subjective   Shanna Brown is a 49 y.o. female who presents for:    #Anemia: has h/o SHAQUILLE taking iron supplements. Has had blood transfusions in the past due to severe anemia.   - taking iron supplements daily with food, was previously taking it every other day  - does have some GI discomfort with it even though she takes it with food  - does report feeling tired, sleepy   - denies lightheadedness, palpitations,   - had colonoscopy in 2023 which was normal per pt  - still having menses,was prescribed OCPS, which she did not find helped  - had a uterine biopsy, denies endometriosis/fibroids     #swelling in legs  - can't tolerate compression socks on right leg due to discomfort   - more swelling on the right vs left  - denies injury/trauma/surgery to the right leg   - had a vascular duplex of right leg w/o DVT  - works as a nurse aid, has to walk a lot, pain on the anterolateral aspect of leg   - has right plantar fascitis being followed by podiatrist    Past Medical History  No past medical history on file.    Current Medications  Current Outpatient Medications   Medication Sig Dispense Refill    ferrous sulfate (IRON 325) 325 (65 Fe) MG tablet Take by mouth every morning (before breakfast)      gabapentin (NEURONTIN) 100 MG capsule Take 1 capsule by mouth daily for 90 days. Max Daily Amount: 100 mg 90 capsule 0    loratadine (CLARITIN) 10 MG tablet Take 10 mg by mouth      norethindrone-ethinyl estradiol-iron (MICROGESTIN FE1.5/30) 1.5-30 MG-MCG tablet Take 1 tablet by mouth daily      omeprazole (PRILOSEC) 40 MG delayed release capsule ceived the following from [facility]: Outside name: omeprazole (PRILOSEC) 40 mg capsule       No current facility-administered medications for this visit.         Objective

## 2024-06-03 ENCOUNTER — OFFICE VISIT (OUTPATIENT)
Age: 49
End: 2024-06-03
Payer: COMMERCIAL

## 2024-06-03 VITALS
DIASTOLIC BLOOD PRESSURE: 73 MMHG | OXYGEN SATURATION: 98 % | TEMPERATURE: 98 F | BODY MASS INDEX: 39.11 KG/M2 | HEART RATE: 64 BPM | SYSTOLIC BLOOD PRESSURE: 120 MMHG | WEIGHT: 235 LBS

## 2024-06-03 DIAGNOSIS — D69.6 THROMBOCYTOPENIA, UNSPECIFIED (HCC): ICD-10-CM

## 2024-06-03 DIAGNOSIS — D50.9 IRON DEFICIENCY ANEMIA, UNSPECIFIED IRON DEFICIENCY ANEMIA TYPE: Primary | ICD-10-CM

## 2024-06-03 DIAGNOSIS — I73.9 PERIPHERAL VASCULAR DISEASE, UNSPECIFIED (HCC): ICD-10-CM

## 2024-06-03 PROCEDURE — 99214 OFFICE O/P EST MOD 30 MIN: CPT

## 2024-06-03 SDOH — ECONOMIC STABILITY: FOOD INSECURITY: WITHIN THE PAST 12 MONTHS, YOU WORRIED THAT YOUR FOOD WOULD RUN OUT BEFORE YOU GOT MONEY TO BUY MORE.: NEVER TRUE

## 2024-06-03 SDOH — ECONOMIC STABILITY: INCOME INSECURITY: HOW HARD IS IT FOR YOU TO PAY FOR THE VERY BASICS LIKE FOOD, HOUSING, MEDICAL CARE, AND HEATING?: NOT HARD AT ALL

## 2024-06-03 SDOH — ECONOMIC STABILITY: HOUSING INSECURITY
IN THE LAST 12 MONTHS, WAS THERE A TIME WHEN YOU DID NOT HAVE A STEADY PLACE TO SLEEP OR SLEPT IN A SHELTER (INCLUDING NOW)?: NO

## 2024-06-03 SDOH — ECONOMIC STABILITY: FOOD INSECURITY: WITHIN THE PAST 12 MONTHS, THE FOOD YOU BOUGHT JUST DIDN'T LAST AND YOU DIDN'T HAVE MONEY TO GET MORE.: NEVER TRUE

## 2024-06-03 ASSESSMENT — PATIENT HEALTH QUESTIONNAIRE - PHQ9
SUM OF ALL RESPONSES TO PHQ QUESTIONS 1-9: 0
2. FEELING DOWN, DEPRESSED OR HOPELESS: NOT AT ALL
SUM OF ALL RESPONSES TO PHQ9 QUESTIONS 1 & 2: 0
SUM OF ALL RESPONSES TO PHQ QUESTIONS 1-9: 0
1. LITTLE INTEREST OR PLEASURE IN DOING THINGS: NOT AT ALL

## 2024-06-04 LAB
ERYTHROCYTE [DISTWIDTH] IN BLOOD BY AUTOMATED COUNT: 22.5 % (ref 11.7–15.4)
FERRITIN SERPL-MCNC: 42 NG/ML (ref 15–150)
HCT VFR BLD AUTO: 34 % (ref 34–46.6)
HGB BLD-MCNC: 9.1 G/DL (ref 11.1–15.9)
IRON SATN MFR SERPL: 83 % (ref 15–55)
IRON SERPL-MCNC: 282 UG/DL (ref 27–159)
MCH RBC QN AUTO: 20.8 PG (ref 26.6–33)
MCHC RBC AUTO-ENTMCNC: 26.8 G/DL (ref 31.5–35.7)
MCV RBC AUTO: 78 FL (ref 79–97)
PLATELET # BLD AUTO: 224 X10E3/UL (ref 150–450)
RBC # BLD AUTO: 4.38 X10E6/UL (ref 3.77–5.28)
TIBC SERPL-MCNC: 339 UG/DL (ref 250–450)
UIBC SERPL-MCNC: 57 UG/DL (ref 131–425)
WBC # BLD AUTO: 5.4 X10E3/UL (ref 3.4–10.8)

## 2024-06-05 ENCOUNTER — TELEPHONE (OUTPATIENT)
Age: 49
End: 2024-06-05

## 2024-06-05 NOTE — TELEPHONE ENCOUNTER
Tried calling the patient regarding results and next steps. Directly went to voicemail. Will try again later today

## 2024-06-05 NOTE — RESULT ENCOUNTER NOTE
CBC: hbg improved from 7 to 9.1 (2 weeks difference)  Ferritin 42 wnl  TIBC wnl, iron and iron sat: increased  UIBC: low

## 2024-06-06 ENCOUNTER — TELEPHONE (OUTPATIENT)
Age: 49
End: 2024-06-06

## 2024-06-06 NOTE — TELEPHONE ENCOUNTER
Tried calling the patient, went directly to voicemail.     Will try to call patient at another time.     Hgb improved from 7>9.1. Ferritin, iron levels wnl.   Pt has menorrhagia, would benefit from IUD (from her OBGYN) to help with the bleeding.     ??will need to ask pt if she has ever been evaluated for hemoglobinopathies (thalassemia) in the past with her microcytic anemia.

## 2024-07-18 ENCOUNTER — OFFICE VISIT (OUTPATIENT)
Age: 49
End: 2024-07-18
Payer: COMMERCIAL

## 2024-07-18 VITALS
HEIGHT: 65 IN | HEART RATE: 69 BPM | WEIGHT: 231 LBS | RESPIRATION RATE: 18 BRPM | OXYGEN SATURATION: 99 % | TEMPERATURE: 97.2 F | DIASTOLIC BLOOD PRESSURE: 70 MMHG | SYSTOLIC BLOOD PRESSURE: 122 MMHG | BODY MASS INDEX: 38.49 KG/M2

## 2024-07-18 DIAGNOSIS — Z11.1 TUBERCULOSIS SCREENING: ICD-10-CM

## 2024-07-18 DIAGNOSIS — E66.01 SEVERE OBESITY (BMI 35.0-39.9) WITH COMORBIDITY (HCC): ICD-10-CM

## 2024-07-18 DIAGNOSIS — D50.9 IRON DEFICIENCY ANEMIA, UNSPECIFIED IRON DEFICIENCY ANEMIA TYPE: Primary | ICD-10-CM

## 2024-07-18 DIAGNOSIS — M79.671 PAIN OF RIGHT HEEL: ICD-10-CM

## 2024-07-18 PROCEDURE — 99214 OFFICE O/P EST MOD 30 MIN: CPT

## 2024-07-18 ASSESSMENT — PATIENT HEALTH QUESTIONNAIRE - PHQ9
2. FEELING DOWN, DEPRESSED OR HOPELESS: NOT AT ALL
1. LITTLE INTEREST OR PLEASURE IN DOING THINGS: NOT AT ALL
SUM OF ALL RESPONSES TO PHQ9 QUESTIONS 1 & 2: 0
SUM OF ALL RESPONSES TO PHQ QUESTIONS 1-9: 0

## 2024-07-18 NOTE — PROGRESS NOTES
[facility]    Subjective   Shanna Brown is a 49 y.o. female who presents for Anemia    She was seen 1 month ago for the same problem. She has had heavy menstrual bleeding for most of her life, she reports having low hemoglobin at that time. Her LMP started on 6/28/24. Her last period was late lasted 3 weeks and she was spotting, then regular flow, then spotting again, 1 week each. She had a colonoscopy in 2023 that she reports is normal. She previously took OCPs for about 1 year, stopped in 2022. She has an appointment with an OBGYN. She has been taking the iron supplement every other day.    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 She had a positive TB skin test in 2018 done for work. She had a follow up chest x-ray and was told she does not have TB. She had a previous quantiferon in 2020 which was negative. Denies recent travel, sick contacts, fevers, or recent weight loss. She does endorse night sweats x 3-4 months.    She has had right foot/heel pain x 2 months. Denies any injury/trauma. Her pain is primarily at her heel, and also recently she has had pain on her right lateral foot near the ankle. She has seen a podiatrist, diagnosed with plantar fasciitis. Shoe inserts have not helped her pain. Rolling her foot on ice provides relief.     Review of Systems   Review of Systems   All other systems reviewed and are negative.       Medical History  No past medical history on file.    Medications  Current Outpatient Medications   Medication Sig    ferrous sulfate (IRON 325) 325 (65 Fe) MG tablet Take by mouth every morning (before breakfast)    loratadine (CLARITIN) 10 MG tablet Take 1 tablet by mouth    omeprazole (PRILOSEC) 40 MG delayed release capsule ceived the following from [facility]: Outside name: omeprazole (PRILOSEC) 40 mg capsule    gabapentin (NEURONTIN) 100 MG capsule Take 1 capsule by mouth daily for 90 days.

## 2024-07-18 NOTE — PROGRESS NOTES
Pt roomed by name and .    Chief Complaint   Patient presents with    Anemia        Vitals:    24 1602   BP: 122/70   Pulse: 69   Resp: 18   Temp: 97.2 °F (36.2 °C)   TempSrc: Temporal   SpO2: 99%   Weight: 104.8 kg (231 lb)   Height: 1.651 m (5' 5\")          \"Have you been to the ER, urgent care clinic since your last visit?  Hospitalized since your last visit?\"    NO    “Have you seen or consulted any other health care providers outside of [facility] since your last visit?”    NO     “Have you had a pap smear?”    NO    No cervical cancer screening on file         “Have you had a colorectal cancer screening such as a colonoscopy/FIT/Cologuard?    NO    No colonoscopy on file  No cologuard on file  No FIT/FOBT on file   No flexible sigmoidoscopy on file         Click Here for Release of Records Request

## 2024-07-19 LAB
BASOPHILS # BLD AUTO: 0 X10E3/UL (ref 0–0.2)
BASOPHILS NFR BLD AUTO: 0 %
EOSINOPHIL # BLD AUTO: 0.1 X10E3/UL (ref 0–0.4)
EOSINOPHIL NFR BLD AUTO: 1 %
ERYTHROCYTE [DISTWIDTH] IN BLOOD BY AUTOMATED COUNT: 18.6 % (ref 11.7–15.4)
HCT VFR BLD AUTO: 35.4 % (ref 34–46.6)
HGB BLD-MCNC: 10.1 G/DL (ref 11.1–15.9)
IMM GRANULOCYTES # BLD AUTO: 0 X10E3/UL (ref 0–0.1)
IMM GRANULOCYTES NFR BLD AUTO: 0 %
LYMPHOCYTES # BLD AUTO: 1.5 X10E3/UL (ref 0.7–3.1)
LYMPHOCYTES NFR BLD AUTO: 27 %
MCH RBC QN AUTO: 23.1 PG (ref 26.6–33)
MCHC RBC AUTO-ENTMCNC: 28.5 G/DL (ref 31.5–35.7)
MCV RBC AUTO: 81 FL (ref 79–97)
MONOCYTES # BLD AUTO: 0.5 X10E3/UL (ref 0.1–0.9)
MONOCYTES NFR BLD AUTO: 10 %
NEUTROPHILS # BLD AUTO: 3.4 X10E3/UL (ref 1.4–7)
NEUTROPHILS NFR BLD AUTO: 62 %
PLATELET # BLD AUTO: 187 X10E3/UL (ref 150–450)
RBC # BLD AUTO: 4.37 X10E6/UL (ref 3.77–5.28)
WBC # BLD AUTO: 5.5 X10E3/UL (ref 3.4–10.8)

## 2024-07-23 ENCOUNTER — TELEPHONE (OUTPATIENT)
Age: 49
End: 2024-07-23

## 2024-07-23 DIAGNOSIS — R76.12 POSITIVE QUANTIFERON-TB GOLD TEST: Primary | ICD-10-CM

## 2024-07-23 LAB
GAMMA INTERFERON BACKGROUND BLD IA-ACNC: 0.04 IU/ML
M TB IFN-G BLD-IMP: POSITIVE
M TB IFN-G CD4+ BCKGRND COR BLD-ACNC: 0.46 IU/ML
M TB IFN-G CD4+CD8+ BCKGRND COR BLD-ACNC: 0.24 IU/ML
MITOGEN IGNF BCKGRD COR BLD-ACNC: >10 IU/ML
QUANTIFERON, INCUBATION: ABNORMAL
SERVICE CMNT-IMP: NORMAL

## 2024-07-23 NOTE — TELEPHONE ENCOUNTER
[facility] Medicine [facility]     Called patient to discuss lab results. Confirmed identity x2 (, name). Discussed positive TB test, and ordered a chest x ray to evaluate. Pt will come to the clinic for the x ray and is understanding. We also discussed her orthopedic referral for her foot pain: Dr. Otto Lyon, office phone number: [phone]. Answered all questions.    Finn Trent MD

## 2024-07-29 ENCOUNTER — TELEPHONE (OUTPATIENT)
Age: 49
End: 2024-07-29

## 2024-07-29 NOTE — TELEPHONE ENCOUNTER
[facility] Medicine [facility]     Called patient to discuss x ray results in setting of positive quanitferon TB screen. Confirmed identity x2 (, name). Had a lengthy discussion with the patient about the positive TB test, negative chest x ray. Pt denies any symptoms today of cough, fever, night sweats. I spoke with her about my contact with the [facility] and their recommendations/guidance that this quantiferon is likely a true positive. With her positive quantiferon, lack of symptoms and negative chest x ray, the health department recommends treating her for latent TB.     Pt was advised that she has the potential to develop into active TB, and was informed of the recommended treatment: rifampin 600mg daily x 4 months. We also discussed the possibility of a once weekly regimen x 3 months that would need to be monitored by the local health department. 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     After this discussion, the patient decided she is not interested in being treated for latent TB at this time. Risks of developing into active TB in the future, spreading TB to others, and the possibility of nursing school/workplace requirements for treatment were readdressed, and the patient declines treatment for now.    I informed the patient that we are available if she changes her mind and wants treatment. Informed her she would need to come to the office for blood work and to discuss rifampin and its side effects if she changes her mind.    Answered all questions.    Finn Trent MD

## 2024-12-20 ENCOUNTER — TRANSCRIBE ORDERS (OUTPATIENT)
Facility: HOSPITAL | Age: 49
End: 2024-12-20

## 2024-12-20 DIAGNOSIS — Z12.31 SCREENING MAMMOGRAM, ENCOUNTER FOR: Primary | ICD-10-CM

## 2025-03-04 PROBLEM — D50.9 IRON DEFICIENCY ANEMIA: Status: ACTIVE | Noted: 2025-03-04

## 2025-03-21 ENCOUNTER — HOSPITAL ENCOUNTER (OUTPATIENT)
Facility: HOSPITAL | Age: 50
Setting detail: INFUSION SERIES
End: 2025-03-21

## 2025-04-01 ENCOUNTER — HOSPITAL ENCOUNTER (OUTPATIENT)
Facility: HOSPITAL | Age: 50
Setting detail: INFUSION SERIES
End: 2025-04-01

## 2025-04-08 ENCOUNTER — HOSPITAL ENCOUNTER (OUTPATIENT)
Facility: HOSPITAL | Age: 50
Setting detail: INFUSION SERIES
End: 2025-04-08